# Patient Record
Sex: MALE | Race: AMERICAN INDIAN OR ALASKA NATIVE | NOT HISPANIC OR LATINO | Employment: FULL TIME | URBAN - METROPOLITAN AREA
[De-identification: names, ages, dates, MRNs, and addresses within clinical notes are randomized per-mention and may not be internally consistent; named-entity substitution may affect disease eponyms.]

---

## 2017-03-29 ENCOUNTER — ALLSCRIPTS OFFICE VISIT (OUTPATIENT)
Dept: OTHER | Facility: OTHER | Age: 62
End: 2017-03-29

## 2018-04-13 ENCOUNTER — OFFICE VISIT (OUTPATIENT)
Dept: DERMATOLOGY | Facility: CLINIC | Age: 63
End: 2018-04-13
Payer: COMMERCIAL

## 2018-04-13 DIAGNOSIS — Z13.89 SCREENING FOR SKIN CONDITION: ICD-10-CM

## 2018-04-13 DIAGNOSIS — L40.9 PSORIASIS: ICD-10-CM

## 2018-04-13 DIAGNOSIS — L82.1 SEBORRHEIC KERATOSIS: ICD-10-CM

## 2018-04-13 DIAGNOSIS — B00.9 HERPES SIMPLEX INFECTION OF SKIN: Primary | ICD-10-CM

## 2018-04-13 PROCEDURE — 99213 OFFICE O/P EST LOW 20 MIN: CPT | Performed by: DERMATOLOGY

## 2018-04-13 RX ORDER — VALACYCLOVIR HYDROCHLORIDE 500 MG/1
1 TABLET, FILM COATED ORAL DAILY
COMMUNITY
Start: 2014-02-12 | End: 2018-04-13 | Stop reason: SDUPTHER

## 2018-04-13 RX ORDER — DESONIDE 0.5 MG/G
CREAM TOPICAL 2 TIMES DAILY
COMMUNITY
Start: 2014-02-12

## 2018-04-13 RX ORDER — LEVOCETIRIZINE DIHYDROCHLORIDE 5 MG/1
1 TABLET, FILM COATED ORAL
COMMUNITY
Start: 2014-02-12

## 2018-04-13 RX ORDER — IBUPROFEN 800 MG/1
TABLET ORAL
COMMUNITY

## 2018-04-13 RX ORDER — DIPHENOXYLATE HYDROCHLORIDE AND ATROPINE SULFATE 2.5; .025 MG/1; MG/1
TABLET ORAL
COMMUNITY

## 2018-04-13 RX ORDER — FLUTICASONE PROPIONATE 50 MCG
SPRAY, SUSPENSION (ML) NASAL
COMMUNITY
Start: 2018-03-28 | End: 2022-07-19

## 2018-04-13 RX ORDER — INSULIN GLARGINE 300 U/ML
INJECTION, SOLUTION SUBCUTANEOUS
COMMUNITY
Start: 2018-04-02

## 2018-04-13 RX ORDER — VALACYCLOVIR HYDROCHLORIDE 500 MG/1
500 TABLET, FILM COATED ORAL DAILY
Qty: 90 TABLET | Refills: 3 | Status: SHIPPED | OUTPATIENT
Start: 2018-04-13 | End: 2019-05-02 | Stop reason: SDUPTHER

## 2018-04-13 NOTE — PATIENT INSTRUCTIONS
herpes simplex infection continue suppressive therapy follow-up in 1 year   psoriasis does not appear to bother patient much can continue just moisturizes as needed  Seborrheic Keratosis  Patient reasurred these are normal growths we acquire with age no treatment needed    Screening for Dermatologic Disorders: Nothing else of concern noted on complete exam follow up in 1 year

## 2018-04-13 NOTE — PROGRESS NOTES
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  Infection patient has been on suppressive therapy with valacyclovir for years without problems noted  No past medical history on file  No past surgical history on file  Social History   History   Alcohol use Not on file     History   Drug use: Unknown     History   Smoking Status    Never Smoker   Smokeless Tobacco    Never Used     No family history on file  Meds/Allergies   Allergies   Allergen Reactions    Aspirin      Other reaction(s): Unknown    Ciprofloxacin      Other reaction(s): (PIMS)    Corticosteroids     Fenofibrate      Other reaction(s): (PIMS) severe myalgias    Metformin      Other reaction(s): (PIMS) GI distress    Penicillin G      Other reaction(s): (PIMS)    Penicillins     Statins        Meds:  Prior to Admission medications    Medication Sig Start Date End Date Taking?  Authorizing Provider   desonide (DESOWEN) 0 05 % cream Apply topically 2 (two) times a day 14  Yes Historical Provider, MD   fluticasone (FLONASE) 50 mcg/act nasal spray USE ONE SPRAY IN EACH NOSTRIL ONCE A DAY  3/28/18  Yes Historical Provider, MD   ibuprofen (MOTRIN) 800 mg tablet Take by mouth   Yes Historical Provider, MD   levocetirizine (XYZAL) 5 MG tablet Take 1 tablet by mouth 14  Yes Historical Provider, MD   multivitamin (THERAGRAN) TABS Take by mouth   Yes Historical Provider, MD   OXYMETAZOLINE HCL, OPHTH, 0 025 % SOLN 2 sprays into each nostril 18 Yes Historical Provider, MD Laura Ch injection pen 300 units/mL  4/2/18  Yes Historical Provider, MD   valACYclovir (VALTREX) 500 mg tablet Take 1 tablet by mouth daily 2/12/14  Yes Historical Provider, MD       Subjective:     Review of Systems:    General: negative for - chills, fatigue, fever,  weight gain or weight loss  Psychological: negative for - anxiety, behavioral disorder, concentration difficulties, decreased libido, depression, irritability, memory difficulties, mood swings, sleep disturbances or suicidal ideation  ENT: negative for - hearing difficulties , nasal congestion, nasal discharge, oral lesions, sinus pain, sneezing, sore throat  Allergy and Immunology: negative for - hives, insect bite sensitivity,  Hematological and Lymphatic: negative for - bleeding problems, blood clots,bruising, swollen lymph nodes  Endocrine: negative for - hair pattern changes, hot flashes, malaise/lethargy, mood swings, palpitations, polydipsia/polyuria, skin changes, temperature intolerance or unexpected weight change  Respiratory: negative for - cough, hemoptysis, orthopnea, shortness of breath, or wheezing  Cardiovascular: negative for - chest pain, dyspnea on exertion, edema,  Gastrointestinal: negative for - abdominal pain, nausea/vomiting  Genito-Urinary: negative for - dysuria, incontinence, irregular/heavy menses or urinary frequency/urgency  Musculoskeletal: negative for - gait disturbance, joint pain, joint stiffness, joint swelling, muscle pain, muscular weakness  Dermatological:  As in HPI  Neurological: negative for confusion, dizziness, headaches, impaired coordination/balance, memory loss, numbness/tingling, seizures, speech problems, tremors or weakness       Objective: There were no vitals taken for this visit      Physical Exam:    General Appearance:    Alert, cooperative, no distress   Head:    Normocephalic, without obvious abnormality, atraumatic           Skin:   A full skin exam was performed including scalp, head scalp, eyes, ears, nose, lips, neck, chest, axilla, abdomen, back, buttocks, bilateral upper extremities, bilateral lower extremities, hands, feet, fingers, toes, fingernails, and toenails  Scaling erythematous areas on the elbows little bit on the feet no other evidence of psoriasis elsewhere nothing else of concern noted exam normal keratotic papules with greasy stuck on appearance     Assessment:     1  Herpes simplex infection of skin  valACYclovir (VALTREX) 500 mg tablet   2  Screening for skin condition     3  Psoriasis     4  Seborrheic keratosis           Plan:    herpes simplex infection continue suppressive therapy follow-up in 1 year   psoriasis does not appear to bother patient much can continue just moisturizes as needed  Seborrheic Keratosis  Patient reasurred these are normal growths we acquire with age no treatment needed  Screening for Dermatologic Disorders: Nothing else of concern noted on complete exam follow up in 1 year       Dong Adair MD  6/84/8927,0:86 AM    Portions of the record may have been created with voice recognition software   Occasional wrong word or "sound a like" substitutions may have occurred due to the inherent limitations of voice recognition software   Read the chart carefully and recognize, using context, where substitutions have occurred

## 2019-05-02 ENCOUNTER — OFFICE VISIT (OUTPATIENT)
Dept: DERMATOLOGY | Facility: CLINIC | Age: 64
End: 2019-05-02
Payer: COMMERCIAL

## 2019-05-02 DIAGNOSIS — Z13.89 SCREENING FOR SKIN CONDITION: ICD-10-CM

## 2019-05-02 DIAGNOSIS — L40.9 PSORIASIS: ICD-10-CM

## 2019-05-02 DIAGNOSIS — L82.1 SEBORRHEIC KERATOSIS: ICD-10-CM

## 2019-05-02 DIAGNOSIS — B00.9 HERPES SIMPLEX INFECTION OF SKIN: Primary | ICD-10-CM

## 2019-05-02 PROCEDURE — 99213 OFFICE O/P EST LOW 20 MIN: CPT | Performed by: DERMATOLOGY

## 2019-05-02 RX ORDER — LOSARTAN POTASSIUM 25 MG/1
12.5 TABLET ORAL DAILY
COMMUNITY
Start: 2018-11-12 | End: 2020-08-13

## 2019-05-02 RX ORDER — VALACYCLOVIR HYDROCHLORIDE 500 MG/1
500 TABLET, FILM COATED ORAL DAILY
Qty: 90 TABLET | Refills: 3 | Status: SHIPPED | OUTPATIENT
Start: 2019-05-02 | End: 2019-12-04 | Stop reason: SDUPTHER

## 2019-12-04 DIAGNOSIS — B00.9 HERPES SIMPLEX INFECTION OF SKIN: ICD-10-CM

## 2019-12-04 RX ORDER — VALACYCLOVIR HYDROCHLORIDE 500 MG/1
500 TABLET, FILM COATED ORAL DAILY
Qty: 90 TABLET | Refills: 3 | Status: SHIPPED | OUTPATIENT
Start: 2019-12-04 | End: 2020-06-05 | Stop reason: SDUPTHER

## 2020-06-05 DIAGNOSIS — B00.9 HERPES SIMPLEX INFECTION OF SKIN: ICD-10-CM

## 2020-06-05 RX ORDER — VALACYCLOVIR HYDROCHLORIDE 500 MG/1
500 TABLET, FILM COATED ORAL DAILY
Qty: 90 TABLET | Refills: 3 | Status: SHIPPED | OUTPATIENT
Start: 2020-06-05 | End: 2020-08-13 | Stop reason: SDUPTHER

## 2020-07-06 ENCOUNTER — TELEPHONE (OUTPATIENT)
Dept: DERMATOLOGY | Facility: CLINIC | Age: 65
End: 2020-07-06

## 2020-08-13 ENCOUNTER — OFFICE VISIT (OUTPATIENT)
Dept: DERMATOLOGY | Facility: CLINIC | Age: 65
End: 2020-08-13
Payer: MEDICARE

## 2020-08-13 VITALS — TEMPERATURE: 97 F

## 2020-08-13 DIAGNOSIS — B00.9 HERPES SIMPLEX INFECTION OF SKIN: Primary | ICD-10-CM

## 2020-08-13 DIAGNOSIS — Z13.89 SCREENING FOR SKIN CONDITION: ICD-10-CM

## 2020-08-13 DIAGNOSIS — L40.9 PSORIASIS: ICD-10-CM

## 2020-08-13 DIAGNOSIS — L82.1 SEBORRHEIC KERATOSIS: ICD-10-CM

## 2020-08-13 PROCEDURE — 99213 OFFICE O/P EST LOW 20 MIN: CPT | Performed by: DERMATOLOGY

## 2020-08-13 RX ORDER — VALACYCLOVIR HYDROCHLORIDE 500 MG/1
500 TABLET, FILM COATED ORAL DAILY
Qty: 90 TABLET | Refills: 3 | Status: SHIPPED | OUTPATIENT
Start: 2020-08-13 | End: 2021-10-08 | Stop reason: SDUPTHER

## 2020-08-13 NOTE — PROGRESS NOTES
[address] [facility] DERMATOLOGY  [address] [place] [id number]  [phone]  [phone]     MRN: [id number] : 1955  Encounter: [id number]  Patient Information: Tena Veras  Chief complaint:Yearly skin check with history of herpes simplex    History of present illness:  79-year-old male with history of diabetes presents for overall skin check  Patient with history of erythema multiforme triggered by herpes simplex infection which has been controlled and suppressed with the use of valacyclovir for many years no other concerns noted at this time  Patient with known history of psoriasis as well  No past medical history on file  No past surgical history on file  Social History   Social History     Substance and Sexual Activity   Alcohol Use None     Social History     Substance and Sexual Activity   Drug Use Not on file     Social History     Tobacco Use   Smoking Status Never Smoker   Smokeless Tobacco Never Used     No family history on file  Meds/Allergies   Allergies   Allergen Reactions    Aspirin      Other reaction(s): Unknown    Ciprofloxacin      Other reaction(s): (PIMS)    Corticosteroids     Fenofibrate      Other reaction(s): (PIMS) severe myalgias    Metformin      Other reaction(s): (PIMS) GI distress    Penicillin G      Other reaction(s): (PIMS)    Penicillins     Statins        Meds:  Prior to Admission medications    Medication Sig Start Date End Date Taking?  Authorizing Provider   desonide (DESOWEN) 0 05 % cream Apply topically 2 (two) times a day 14  Yes Historical Provider, MD   fluticasone (FLONASE) 50 mcg/act nasal spray USE ONE SPRAY IN EACH NOSTRIL ONCE A DAY  3/28/18  Yes Historical Provider, MD   ibuprofen (MOTRIN) 800 mg tablet Take by mouth   Yes Historical Provider, MD   insulin glargine (Basaglar KwikPen) 100 units/mL injection pen Inject under the skin daily   Yes Historical Provider, MD   insulin lispro (HUMALOG) 100 units/mL injection Inject under the skin 3 (three) times a day before meals   Yes Historical Provider, MD   levocetirizine (XYZAL) 5 MG tablet Take 1 tablet by mouth 2/12/14  Yes Historical Provider, MD   losartan (COZAAR) 25 mg tablet Take 12 5 mg by mouth daily 11/12/18 8/13/20 Yes Historical Provider, MD   multivitamin (THERAGRAN) TABS Take by mouth   Yes Historical Provider, MD   OXYMETAZOLINE HCL, OPHTH, 0 025 % SOLN 2 sprays into each nostril 4/2/18  Yes Historical Provider, MD   valACYclovir (VALTREX) 500 mg tablet Take 1 tablet (500 mg total) by mouth daily 6/5/20 9/3/20 Yes Corrie Neff MD   Cloteal Buys SOLOSTAR injection pen 300 units/mL  4/2/18   Historical Provider, MD       Subjective:     Review of Systems:    General: negative for - chills, fatigue, fever,  weight gain or weight loss  Psychological: negative for - anxiety, behavioral disorder, concentration difficulties, decreased libido, depression, irritability, memory difficulties, mood swings, sleep disturbances or suicidal ideation  ENT: negative for - hearing difficulties , nasal congestion, nasal discharge, oral lesions, sinus pain, sneezing, sore throat  Allergy and Immunology: negative for - hives, insect bite sensitivity,  Hematological and Lymphatic: negative for - bleeding problems, blood clots,bruising, swollen lymph nodes  Endocrine: negative for - hair pattern changes, hot flashes, malaise/lethargy, mood swings, palpitations, polydipsia/polyuria, skin changes, temperature intolerance or unexpected weight change  Respiratory: negative for - cough, hemoptysis, orthopnea, shortness of breath, or wheezing  Cardiovascular: negative for - chest pain, dyspnea on exertion, edema,  Gastrointestinal: negative for - abdominal pain, nausea/vomiting  Genito-Urinary: negative for - dysuria, incontinence, irregular/heavy menses or urinary frequency/urgency  Musculoskeletal: negative for - gait disturbance, joint pain, joint stiffness, joint swelling, muscle pain, muscular weakness  Dermatological:  As in HPI  Neurological: negative for confusion, dizziness, headaches, impaired coordination/balance, memory loss, numbness/tingling, seizures, speech problems, tremors or weakness       Objective:   Temp (!) 97 °F (36 1 °C)     Physical Exam:    General Appearance:    Alert, cooperative, no distress   Head:    Normocephalic, without obvious abnormality, atraumatic           Skin:   A full skin exam was performed including scalp, head scalp, eyes, ears, nose, lips, neck, chest, axilla, abdomen, back, buttocks, bilateral upper extremities, bilateral lower extremities, hands, feet, fingers, toes, fingernails, and toenails slight scaling noted on the scalp little bit and nasal creases and elbows normal keratotic papules greasy stuck on appearance nothing else remarkable noted on complete exam     Assessment:     1  Herpes simplex infection of skin  valACYclovir (VALTREX) 500 mg tablet   2  Psoriasis     3  Seborrheic keratosis     4  Screening for skin condition           Plan:   Herpes simplex infection continue to treat with suppressive therapy due to his history of erythema multiforme  Psoriasis slightly active continue topical therapy suggest anti dandruff shampoo  Seborrheic Keratosis  Patient reasurred these are normal growths we acquire with age no treatment needed  Screening for Dermatologic Disorders: Nothing else of concern noted on complete exam follow up in 1 year       Josephine Cordova MD  8/13/2020,3:35 PM    Portions of the record may have been created with voice recognition software   Occasional wrong word or "sound a like" substitutions may have occurred due to the inherent limitations of voice recognition software   Read the chart carefully and recognize, using context, where substitutions have occurred

## 2020-08-13 NOTE — PATIENT INSTRUCTIONS
Herpes simplex infection continue to treat with suppressive therapy due to his history of erythema multiforme  Psoriasis slightly active continue topical therapy suggest anti dandruff shampoo  Seborrheic Keratosis  Patient reasurred these are normal growths we acquire with age no treatment needed    Screening for Dermatologic Disorders: Nothing else of concern noted on complete exam follow up in 1 year

## 2021-03-25 ENCOUNTER — OFFICE VISIT (OUTPATIENT)
Dept: DERMATOLOGY | Facility: CLINIC | Age: 66
End: 2021-03-25
Payer: MEDICARE

## 2021-03-25 VITALS — TEMPERATURE: 97.5 F

## 2021-03-25 DIAGNOSIS — L57.0 ACTINIC KERATOSIS: Primary | ICD-10-CM

## 2021-03-25 DIAGNOSIS — Z13.89 SCREENING FOR SKIN CONDITION: ICD-10-CM

## 2021-03-25 PROCEDURE — 99212 OFFICE O/P EST SF 10 MIN: CPT | Performed by: DERMATOLOGY

## 2021-03-25 PROCEDURE — 17000 DESTRUCT PREMALG LESION: CPT | Performed by: DERMATOLOGY

## 2021-03-25 RX ORDER — PEN NEEDLE, DIABETIC 32GX 5/32"
NEEDLE, DISPOSABLE MISCELLANEOUS
COMMUNITY
Start: 2021-01-25

## 2021-03-25 RX ORDER — ALBUTEROL SULFATE 90 UG/1
AEROSOL, METERED RESPIRATORY (INHALATION)
COMMUNITY
Start: 2021-01-27

## 2021-03-25 RX ORDER — HYDROCODONE BITARTRATE AND ACETAMINOPHEN 7.5; 325 MG/1; MG/1
TABLET ORAL
COMMUNITY

## 2021-03-25 RX ORDER — LEVOCETIRIZINE DIHYDROCHLORIDE 5 MG/1
TABLET, FILM COATED ORAL DAILY
COMMUNITY

## 2021-03-25 RX ORDER — ACETAMINOPHEN 325 MG/1
TABLET ORAL
COMMUNITY

## 2021-03-25 RX ORDER — CYCLOBENZAPRINE HCL 10 MG
10 TABLET ORAL 3 TIMES DAILY PRN
COMMUNITY
Start: 2021-03-23 | End: 2021-04-02

## 2021-03-25 RX ORDER — ONDANSETRON 4 MG/1
TABLET, ORALLY DISINTEGRATING ORAL EVERY 12 HOURS
COMMUNITY

## 2021-03-25 NOTE — PROGRESS NOTES
[address] [facility] DERMATOLOGY  [address] [place] [address]  [facility] [address]  [phone]  [phone]     MRN: [id number] : 1955  Encounter: [id number]  Patient Information: Lenny Jo  Chief complaint:  lesion on nose    History of present illness:  77year old male presents for lesion on the nose patient with known history of psoriasis and history of erythema multiforme due to a herpes simplex infection  Patient due for overall checkup in August   No past medical history on file  No past surgical history on file  Social History   Social History     Substance and Sexual Activity   Alcohol Use None     Social History     Substance and Sexual Activity   Drug Use Not on file     Social History     Tobacco Use   Smoking Status Never Smoker   Smokeless Tobacco Never Used     No family history on file  Meds/Allergies   Allergies   Allergen Reactions    Aspirin      Other reaction(s): Unknown    Ciprofloxacin      Other reaction(s): (PIMS)    Corticosteroids     Fenofibrate      Other reaction(s): (PIMS) severe myalgias    Metformin      Other reaction(s): (PIMS) GI distress    Penicillin G      Other reaction(s): (PIMS)    Penicillins     Statins        Meds:  Prior to Admission medications    Medication Sig Start Date End Date Taking?  Authorizing Provider   acetaminophen (Tylenol) 325 mg tablet Tylenol 325 mg tablet   Yes Historical Provider, MD   albuterol (PROVENTIL HFA,VENTOLIN HFA) 90 mcg/act inhaler  21  Yes Historical Provider, MD   BD Pen Needle Keke U/F 32G X 4 MM MISC  21  Yes Historical Provider, MD   canagliflozin (Invokana) 100 mg Invokana 100 mg tablet   Yes Historical Provider, MD   cyclobenzaprine (FLEXERIL) 10 mg tablet Take 10 mg by mouth Three times daily as needed 3/23/21 4/2/21 Yes Historical Provider, MD   desonide (DESOWEN) 0 05 % cream Apply topically 2 (two) times a day 14  Yes Historical Provider, MD   Diclofenac Epolamine (Flector) 1 3 % PTCH Every 12 hours   Yes Historical Provider, MD   fluticasone (FLONASE) 50 mcg/act nasal spray USE ONE SPRAY IN EACH NOSTRIL ONCE A DAY  3/28/18  Yes Historical Provider, MD   HYDROcodone-acetaminophen (NORCO) 7 5-325 mg per tablet hydrocodone 7 5 mg-acetaminophen 325 mg tablet   Yes Historical Provider, MD   ibuprofen (MOTRIN) 800 mg tablet Take by mouth   Yes Historical Provider, MD   insulin glargine (Basaglar KwikPen) 100 units/mL injection pen Inject under the skin daily   Yes Historical Provider, MD   insulin lispro (HUMALOG) 100 units/mL injection Inject under the skin 3 (three) times a day before meals   Yes Historical Provider, MD   levocetirizine (XYZAL) 5 MG tablet Take 1 tablet by mouth 2/12/14  Yes Historical Provider, MD   multivitamin (THERAGRAN) TABS Take by mouth   Yes Historical Provider, MD   OXYMETAZOLINE HCL, OPHTH, 0 025 % SOLN 2 sprays into each nostril 4/2/18  Yes Historical Provider, MD Allan Baptiste SOLOSTAR injection pen 300 units/mL  4/2/18  Yes Historical Provider, MD   levocetirizine (Xyzal) 5 MG tablet daily    Historical Provider, MD   losartan (COZAAR) 25 mg tablet Take 12 5 mg by mouth daily 11/12/18 8/13/20  Historical Provider, MD   valACYclovir (VALTREX) 500 mg tablet Take 1 tablet (500 mg total) by mouth daily 8/13/20 11/11/20  Lidia Woods MD       Subjective:     Review of Systems:    General: negative for - chills, fatigue, fever,  weight gain or weight loss  Psychological: negative for - anxiety, behavioral disorder, concentration difficulties, decreased libido, depression, irritability, memory difficulties, mood swings, sleep disturbances or suicidal ideation  ENT: negative for - hearing difficulties , nasal congestion, nasal discharge, oral lesions, sinus pain, sneezing, sore throat  Allergy and Immunology: negative for - hives, insect bite sensitivity,  Hematological and Lymphatic: negative for - bleeding problems, blood clots,bruising, swollen lymph nodes  Endocrine: negative for - hair pattern changes, hot flashes, malaise/lethargy, mood swings, palpitations, polydipsia/polyuria, skin changes, temperature intolerance or unexpected weight change  Respiratory: negative for - cough, hemoptysis, orthopnea, shortness of breath, or wheezing  Cardiovascular: negative for - chest pain, dyspnea on exertion, edema,  Gastrointestinal: negative for - abdominal pain, nausea/vomiting  Genito-Urinary: negative for - dysuria, incontinence, irregular/heavy menses or urinary frequency/urgency  Musculoskeletal: negative for - gait disturbance, joint pain, joint stiffness, joint swelling, muscle pain, muscular weakness  Dermatological:  As in HPI  Neurological: negative for confusion, dizziness, headaches, impaired coordination/balance, memory loss, numbness/tingling, seizures, speech problems, tremors or weakness       Objective:   Temp 97 5 °F (36 4 °C) (Temporal)     Physical Exam:    General Appearance:    Alert, cooperative, no distress   Head:    Normocephalic, without obvious abnormality, atraumatic           Skin:   A full skin exam was performed including scalp, head scalp, eyes, ears, nose, lips, neck, chest, axilla, abdomen, back, buttocks, bilateral upper extremities, bilateral lower extremities, hands, feet, fingers, toes, fingernails, and toenails scaling erythematous area noted on the nose nothing else noted on exam  Physical Exam  HENT:      Head:          Cryotherapy Procedure Note    Pre-operative Diagnosis: actinic keratosis    Plan:  1  Instructed to keep the area dry and clean thereafter  Apply petrolatum if area gets crusty  2  Recommended that the patient use acetaminophen  as needed for pain       Locations:   nasal tip    Indications: Destruction of actinic keratosis x1    Patient informed of risks (permanent scarring, infection, light or dark discoloration, bleeding, infection, weakness, numbness and recurrence of the lesion) and benefits of the procedure and verbal informed consent obtained  The areas are treated with liquid nitrogen therapy, frozen until ice ball extended 2 mm beyond lesion, allowed to thaw, and treated again  The patient tolerated procedure well  The patient was instructed on post-op care, warned that there may be blister formation, redness and pain  Recommend OTC analgesia as needed for pain  Condition:  Stable    Complications:  none  Assessment:     1  Actinic keratosis           Plan:   Actinic Keratosis:  Patient advised lesions are precancers  These should resolve with cryosurgery if not to let us know sun block recommended  Patient to return follow-up in August for overall checkup    Elle Hays MD  3/25/2021,9:35 AM    Portions of the record may have been created with voice recognition software   Occasional wrong word or "sound a like" substitutions may have occurred due to the inherent limitations of voice recognition software   Read the chart carefully and recognize, using context, where substitutions have occurred

## 2021-08-24 ENCOUNTER — OFFICE VISIT (OUTPATIENT)
Dept: DERMATOLOGY | Facility: CLINIC | Age: 66
End: 2021-08-24
Payer: MEDICARE

## 2021-08-24 DIAGNOSIS — L40.9 PSORIASIS: Primary | ICD-10-CM

## 2021-08-24 DIAGNOSIS — Z13.89 SCREENING FOR SKIN CONDITION: ICD-10-CM

## 2021-08-24 DIAGNOSIS — L82.1 SEBORRHEIC KERATOSIS: ICD-10-CM

## 2021-08-24 PROCEDURE — 99213 OFFICE O/P EST LOW 20 MIN: CPT | Performed by: DERMATOLOGY

## 2021-08-24 NOTE — PROGRESS NOTES
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 presents for overall checkup no specific concerns noted today also with history of psoriasis which he is treated only with moisturizers  History reviewed  No pertinent past medical history  History reviewed  No pertinent surgical history  Social History   Social History     Substance and Sexual Activity   Alcohol Use None     Social History     Substance and Sexual Activity   Drug Use Not on file     Social History     Tobacco Use   Smoking Status Never Smoker   Smokeless Tobacco Never Used     History reviewed  No pertinent family history  Meds/Allergies   Allergies   Allergen Reactions    Aspirin      Other reaction(s): Unknown    Ciprofloxacin      Other reaction(s): (PIMS)    Corticosteroids     Fenofibrate      Other reaction(s): (PIMS) severe myalgias    Metformin      Other reaction(s): (PIMS) GI distress    Penicillin G      Other reaction(s): (PIMS)    Penicillins     Statins        Meds:  Prior to Admission medications    Medication Sig Start Date End Date Taking?  Authorizing Provider   acetaminophen (Tylenol) 325 mg tablet Tylenol 325 mg tablet   Yes Historical Provider, MD   albuterol (PROVENTIL HFA,VENTOLIN HFA) 90 mcg/act inhaler  21  Yes Historical Provider, MD   BD Pen Needle Keke U/F 32G X 4 MM MISC  21  Yes Historical Provider, MD   canagliflozin (Invokana) 100 mg Invokana 100 mg tablet   Yes Historical Provider, MD   desonide (DESOWEN) 0 05 % cream Apply topically 2 (two) times a day 14  Yes Historical Provider, MD   Diclofenac Epolamine (Flector) 1 3 % PTCH Every 12 hours   Yes Historical Provider, MD   fluticasone (FLONASE) 50 mcg/act nasal spray USE ONE SPRAY IN EACH NOSTRIL ONCE A DAY  3/28/18  Yes Historical Provider, MD   HYDROcodone-acetaminophen ([address]) 7 5-325 mg per tablet hydrocodone 7 5 mg-acetaminophen 325 mg tablet   Yes Historical Provider, MD   ibuprofen (MOTRIN) 800 mg tablet Take by mouth   Yes Historical Provider, MD   insulin glargine (Basaglar KwikPen) 100 units/mL injection pen Inject under the skin daily   Yes Historical Provider, MD   insulin lispro (HUMALOG) 100 units/mL injection Inject under the skin 3 (three) times a day before meals   Yes Historical Provider, MD   levocetirizine (XYZAL) 5 MG tablet Take 1 tablet by mouth 2/12/14  Yes Historical Provider, MD   multivitamin (THERAGRAN) TABS Take by mouth   Yes Historical Provider, MD   OXYMETAZOLINE HCL, OPHTH, 0 025 % SOLN 2 sprays into each nostril 4/2/18  Yes Historical Provider, MD   cyclobenzaprine (FLEXERIL) 10 mg tablet Take 10 mg by mouth Three times daily as needed 3/23/21 4/2/21  Historical Provider, MD   levocetirizine (Xyzal) 5 MG tablet daily  Patient not taking: Reported on 8/24/2021    Historical Provider, MD   losartan (COZAAR) 25 mg tablet Take 12 5 mg by mouth daily 11/12/18 8/13/20  Historical Provider, MD Lillian Cohen SOLOSTAR injection pen 300 units/mL  4/2/18   Historical Provider, MD   valACYclovir (VALTREX) 500 mg tablet Take 1 tablet (500 mg total) by mouth daily 8/13/20 11/11/20  Annalee Walsh MD       Subjective:     Review of Systems:    General: negative for - chills, fatigue, fever,  weight gain or weight loss  Psychological: negative for - anxiety, behavioral disorder, concentration difficulties, decreased libido, depression, irritability, memory difficulties, mood swings, sleep disturbances or suicidal ideation  ENT: negative for - hearing difficulties , nasal congestion, nasal discharge, oral lesions, sinus pain, sneezing, sore throat  Allergy and Immunology: negative for - hives, insect bite sensitivity,  Hematological and Lymphatic: negative for - bleeding problems, blood clots,bruising, swollen lymph nodes  Endocrine: negative for - hair pattern changes, hot flashes, malaise/lethargy, mood swings, palpitations, polydipsia/polyuria, skin changes, temperature intolerance or unexpected weight change  Respiratory: negative for - cough, hemoptysis, orthopnea, shortness of breath, or wheezing  Cardiovascular: negative for - chest pain, dyspnea on exertion, edema,  Gastrointestinal: negative for - abdominal pain, nausea/vomiting  Genito-Urinary: negative for - dysuria, incontinence, irregular/heavy menses or urinary frequency/urgency  Musculoskeletal: negative for - gait disturbance, joint pain, joint stiffness, joint swelling, muscle pain, muscular weakness  Dermatological:  As in HPI  Neurological: negative for confusion, dizziness, headaches, impaired coordination/balance, memory loss, numbness/tingling, seizures, speech problems, tremors or weakness       Objective: There were no vitals taken for this visit  Physical Exam:    General Appearance:    Alert, cooperative, no distress   Head:    Normocephalic, without obvious abnormality, atraumatic           Skin:   A full skin exam was performed including scalp, head scalp, eyes, ears, nose, lips, neck, chest, axilla, abdomen, back, buttocks, bilateral upper extremities, bilateral lower extremities, hands, feet, fingers, toes, fingernails, and toenails well-demarcated erythematous scaling plaques with silvery scale noted on the elbows a little bit on the shin no other evidence psoriasis at this point no signs of any actinic keratosis nothing else remarkable noted on complete exam     Assessment:     1  Psoriasis     2  Seborrheic keratosis     3   Screening for skin condition           Plan:    psoriasis is not really symptomatic at this point good control continue same therapy  Seborrheic Keratosis  Patient reasurred these are normal growths we acquire with age no treatment needed  Screening for Dermatologic Disorders: Nothing else of concern noted on complete exam follow up in 1 year         Richi Martinez MD  1/95/8327,2:95 PM    Portions of the record may have been created with voice recognition software   Occasional wrong word or "sound a like" substitutions may have occurred due to the inherent limitations of voice recognition software   Read the chart carefully and recognize, using context, where substitutions have occurred

## 2021-08-24 NOTE — PATIENT INSTRUCTIONS
psoriasis is not really symptomatic at this point good control continue same therapy  Seborrheic Keratosis  Patient reasurred these are normal growths we acquire with age no treatment needed    Screening for Dermatologic Disorders: Nothing else of concern noted on complete exam follow up in 1 year

## 2021-10-08 DIAGNOSIS — B00.9 HERPES SIMPLEX INFECTION OF SKIN: ICD-10-CM

## 2021-10-08 RX ORDER — VALACYCLOVIR HYDROCHLORIDE 500 MG/1
500 TABLET, FILM COATED ORAL DAILY
Qty: 90 TABLET | Refills: 3 | Status: SHIPPED | OUTPATIENT
Start: 2021-10-08 | End: 2022-01-06

## 2022-01-11 ENCOUNTER — OFFICE VISIT (OUTPATIENT)
Dept: DERMATOLOGY | Facility: CLINIC | Age: 67
End: 2022-01-11
Payer: MEDICARE

## 2022-01-11 DIAGNOSIS — T14.8XXA EXCORIATION: Primary | ICD-10-CM

## 2022-01-11 PROCEDURE — 99212 OFFICE O/P EST SF 10 MIN: CPT | Performed by: DERMATOLOGY

## 2022-01-11 NOTE — PATIENT INSTRUCTIONS
Unable to really assess lesion on the nose if it does not completely healed ahead the next visit we will plan on biopsy other than that patient advised not to pick at these areas

## 2022-01-11 NOTE — PROGRESS NOTES
500 [facility] DERMATOLOGY  [address] [place] [address]  [phone]  [phone]     MRN: [id number] : 1955  Encounter: [id number]  Patient Information: Jacquelin Buaer    Subjective:     77year old male had a small scaling area on his nose that he picked off and was concerned that it was not healing correctly but now that it has been about 2 weeks since this occurred appears to be resolving     Objective: There were no vitals taken for this visit  Physical Exam:    General Appearance:    Alert, cooperative, no distress   Skin:   Small healing erosion on the nose     Assessment:     1  Excoriation           Plan:   Unable to really assess lesion on the nose if it does not completely healed ahead the next visit we will plan on biopsy other than that patient advised not to pick at these areas      Prior to Admission medications    Medication Sig Start Date End Date Taking?  Authorizing Provider   acetaminophen (Tylenol) 325 mg tablet Tylenol 325 mg tablet   Yes Historical Provider, MD   albuterol (PROVENTIL HFA,VENTOLIN HFA) 90 mcg/act inhaler  21  Yes Historical Provider, MD   BD Pen Needle Keke U/F 32G X 4 MM MISC  21  Yes Historical Provider, MD   canagliflozin (Invokana) 100 mg Invokana 100 mg tablet   Yes Historical Provider, MD   desonide (DESOWEN) 0 05 % cream Apply topically 2 (two) times a day 14  Yes Historical Provider, MD   fluticasone (FLONASE) 50 mcg/act nasal spray USE ONE SPRAY IN EACH NOSTRIL ONCE A DAY  3/28/18  Yes Historical Provider, MD   HYDROcodone-acetaminophen (NORCO) 7 5-325 mg per tablet hydrocodone 7 5 mg-acetaminophen 325 mg tablet   Yes Historical Provider, MD   ibuprofen (MOTRIN) 800 mg tablet Take by mouth   Yes Historical Provider, MD   insulin glargine (Basaglar KwikPen) 100 units/mL injection pen Inject under the skin daily   Yes Historical Provider, MD   insulin lispro (HUMALOG) 100 units/mL injection Inject under the skin 3 (three) times a day before meals   Yes Historical Provider, MD   levocetirizine (XYZAL) 5 MG tablet Take 1 tablet by mouth 2/12/14  Yes Historical Provider, MD   levocetirizine (Xyzal) 5 MG tablet daily     Yes Historical Provider, MD   multivitamin (THERAGRAN) TABS Take by mouth   Yes Historical Provider, MD   OXYMETAZOLINE HCL, OPHTH, 0 025 % SOLN 2 sprays into each nostril 4/2/18  Yes Historical Provider, MD Michael Smart SOLOSTAR injection pen 300 units/mL   4/2/18  Yes Historical Provider, MD   cyclobenzaprine (FLEXERIL) 10 mg tablet Take 10 mg by mouth Three times daily as needed 3/23/21 4/2/21  Historical Provider, MD   Diclofenac Epolamine (Flector) 1 3 % PTCH Every 12 hours    Historical Provider, MD   losartan (COZAAR) 25 mg tablet Take 12 5 mg by mouth daily 11/12/18 8/13/20  Historical Provider, MD   valACYclovir (VALTREX) 500 mg tablet Take 1 tablet (500 mg total) by mouth daily 10/8/21 1/6/22  Savanna Keating MD     Allergies   Allergen Reactions    Aspirin      Other reaction(s): Unknown    Ciprofloxacin      Other reaction(s): (PIMS)    Corticosteroids     Fenofibrate      Other reaction(s): (PIMS) severe myalgias    Metformin      Other reaction(s): (PIMS) GI distress    Penicillin G      Other reaction(s): (PIMS)    Penicillins     Statins        Savanna Keating MD  1/11/2022,3:21 PM    Portions of the record may have been created with voice recognition software   Occasional wrong word or "sound a like" substitutions may have occurred due to the inherent limitations of voice recognition software   Read the chart carefully and recognize, using context, where substitutions have occurred

## 2022-10-03 ENCOUNTER — OFFICE VISIT (OUTPATIENT)
Dept: DERMATOLOGY | Facility: CLINIC | Age: 67
End: 2022-10-03
Payer: MEDICARE

## 2022-10-03 VITALS — WEIGHT: 225 LBS | BODY MASS INDEX: 29.82 KG/M2 | HEIGHT: 73 IN

## 2022-10-03 DIAGNOSIS — B00.9 HERPES SIMPLEX INFECTION OF SKIN: ICD-10-CM

## 2022-10-03 DIAGNOSIS — L82.1 SEBORRHEIC KERATOSIS: ICD-10-CM

## 2022-10-03 DIAGNOSIS — Z13.89 SCREENING FOR SKIN CONDITION: ICD-10-CM

## 2022-10-03 DIAGNOSIS — L40.9 PSORIASIS: Primary | ICD-10-CM

## 2022-10-03 PROCEDURE — 99214 OFFICE O/P EST MOD 30 MIN: CPT | Performed by: DERMATOLOGY

## 2022-10-03 RX ORDER — VALACYCLOVIR HYDROCHLORIDE 500 MG/1
500 TABLET, FILM COATED ORAL DAILY
Qty: 90 TABLET | Refills: 3 | Status: SHIPPED | OUTPATIENT
Start: 2022-10-03 | End: 2023-01-01

## 2022-10-03 NOTE — PROGRESS NOTES
[facility] DERMATOLOGY  [address] [place] [address]  [phone]  [phone]     MRN: [id number] : 1955  Encounter: [id number]  Patient Information: Eric Merrill  Chief complaint: yearly check up    History of present illness:  51-year-old male presents for overall checkup history of erythema multiforme related to herpes simplex infection also with known history of psoriasis no specific concerns or changes noted  No past medical history on file  No past surgical history on file  Social History   Social History     Substance and Sexual Activity   Alcohol Use None     Social History     Substance and Sexual Activity   Drug Use Not on file     Social History     Tobacco Use   Smoking Status Never Smoker   Smokeless Tobacco Never Used     No family history on file  Meds/Allergies   Allergies   Allergen Reactions    Aspirin      Other reaction(s): Unknown    Ciprofloxacin      Other reaction(s): (PIMS)    Corticosteroids     Fenofibrate      Other reaction(s): (PIMS) severe myalgias    Metformin      Other reaction(s): (PIMS) GI distress    Penicillin G      Other reaction(s): (PIMS)    Penicillins     Statins        Meds:  Prior to Admission medications    Medication Sig Start Date End Date Taking?  Authorizing Provider   acetaminophen (TYLENOL) 325 mg tablet Tylenol 325 mg tablet   Yes Historical Provider, MD   albuterol (PROVENTIL HFA,VENTOLIN HFA) 90 mcg/act inhaler  21  Yes Historical Provider, MD   BD Pen Needle Keke U/F 32G X 4 MM MISC  21  Yes Historical Provider, MD   canagliflozin (INVOKANA) 100 mg Invokana 100 mg tablet   Yes Historical Provider, MD   cyclobenzaprine (FLEXERIL) 10 mg tablet Take 10 mg by mouth Three times daily as needed 3/23/21 10/3/22 Yes Historical Provider, MD   desonide (DESOWEN) 0 05 % cream Apply topically 2 (two) times a day 14  Yes Historical Provider, MD   fluticasone (FLONASE) 50 mcg/act nasal spray USE ONE SPRAY IN EACH NOSTRIL TWICE DAILY 7/19/22  Yes Delvis Mason MD   HYDROcodone-acetaminophen (NORCO) 7 5-325 mg per tablet hydrocodone 7 5 mg-acetaminophen 325 mg tablet   Yes Historical Provider, MD   ibuprofen (MOTRIN) 800 mg tablet Take by mouth   Yes Historical Provider, MD   insulin glargine (LANTUS SOLOSTAR) 100 units/mL injection pen Inject under the skin daily   Yes Historical Provider, MD   insulin lispro (HumaLOG) 100 units/mL injection Inject under the skin 3 (three) times a day before meals   Yes Historical Provider, MD   levocetirizine (XYZAL) 5 MG tablet daily     Yes Historical Provider, MD   levocetirizine (XYZAL) 5 MG tablet TAKE ONE TABLET BY MOUTH EVERY DAY 7/12/22  Yes Delvis Mason MD   losartan (COZAAR) 25 mg tablet Take 12 5 mg by mouth daily 11/12/18 10/3/22 Yes Historical Provider, MD   multivitamin (THERAGRAN) TABS Take by mouth   Yes Historical Provider, MD   OXYMETAZOLINE HCL, OPHTH, 0 025 % SOLN 2 sprays into each nostril 4/2/18  Yes Historical Provider, MD   valACYclovir (VALTREX) 500 mg tablet Take 1 tablet (500 mg total) by mouth daily 10/8/21 10/3/22 Yes Aden Bowen MD   Diclofenac Epolamine 1 3 % PTCH Every 12 hours    Historical Provider, MD   levocetirizine (XYZAL) 5 MG tablet Take 1 tablet by mouth  Patient not taking: Reported on 10/3/2022 2/12/14   Historical Provider, MD Kari Mott injection pen 300 units/mL   4/2/18   Historical Provider, MD       Subjective:     Review of Systems:    General: negative for - chills, fatigue, fever,  weight gain or weight loss  Psychological: negative for - anxiety, behavioral disorder, concentration difficulties, decreased libido, depression, irritability, memory difficulties, mood swings, sleep disturbances or suicidal ideation  ENT: negative for - hearing difficulties , nasal congestion, nasal discharge, oral lesions, sinus pain, sneezing, sore throat  Allergy and Immunology: negative for - hives, insect bite sensitivity,  Hematological and Lymphatic: negative for - bleeding problems, blood clots,bruising, swollen lymph nodes  Endocrine: negative for - hair pattern changes, hot flashes, malaise/lethargy, mood swings, palpitations, polydipsia/polyuria, skin changes, temperature intolerance or unexpected weight change  Respiratory: negative for - cough, hemoptysis, orthopnea, shortness of breath, or wheezing  Cardiovascular: negative for - chest pain, dyspnea on exertion, edema,  Gastrointestinal: negative for - abdominal pain, nausea/vomiting  Genito-Urinary: negative for - dysuria, incontinence, irregular/heavy menses or urinary frequency/urgency  Musculoskeletal: negative for - gait disturbance, joint pain, joint stiffness, joint swelling, muscle pain, muscular weakness  Dermatological:  As in HPI  Neurological: negative for confusion, dizziness, headaches, impaired coordination/balance, memory loss, numbness/tingling, seizures, speech problems, tremors or weakness       Objective:   Ht 6' 1" (1 854 m)   Wt 102 kg (225 lb)   BMI 29 69 kg/m²     Physical Exam:    General Appearance:    Alert, cooperative, no distress   Head:    Normocephalic, without obvious abnormality, atraumatic           Skin:   A full skin exam was performed including scalp, head scalp, eyes, ears, nose, lips, neck, chest, axilla, abdomen, back, buttocks, bilateral upper extremities, bilateral lower extremities, hands, feet, fingers, toes, fingernails, and toenails slight erythema scaling noted on the elbows normal keratotic papules greasy stuck on appearance nothing else atypical noted on exam     Assessment:     1  Psoriasis     2  Herpes simplex infection of skin  valACYclovir (VALTREX) 500 mg tablet   3  Seborrheic keratosis     4   Screening for skin condition           Plan:   Psoriasis not real active no treatment indicated this time  History of recurrent herpes simplex infection and erythema multiforme patient will continue suppressive therapy with valacyclovir  Seborrheic Keratosis  Patient reasurred these are normal growths we acquire with age no treatment needed  Screening for Dermatologic Disorders: Nothing else of concern noted on complete exam follow up in 1 year     Juju Coburn  10/3/2022,10:09 AM    Portions of the record may have been created with voice recognition software   Occasional wrong word or "sound a like" substitutions may have occurred due to the inherent limitations of voice recognition software   Read the chart carefully and recognize, using context, where substitutions have occurred

## 2022-10-03 NOTE — PATIENT INSTRUCTIONS
Psoriasis not real active no treatment indicated this time  History of recurrent herpes simplex infection and erythema multiforme patient will continue suppressive therapy with valacyclovir  Seborrheic Keratosis  Patient reasurred these are normal growths we acquire with age no treatment needed    Screening for Dermatologic Disorders: Nothing else of concern noted on complete exam follow up in 1 year

## 2022-11-21 ENCOUNTER — TELEPHONE (OUTPATIENT)
Dept: DERMATOLOGY | Age: 67
End: 2022-11-21

## 2022-11-21 NOTE — TELEPHONE ENCOUNTER
Patient left message stating he could not find is after visit summary for visit to Dr Yari Ozuna  Returned pt called, pt stated call was done by accident   He was able to view is visit on [employer]

## 2023-07-03 ENCOUNTER — OFFICE VISIT (OUTPATIENT)
Age: 68
End: 2023-07-03
Payer: MEDICARE

## 2023-07-03 VITALS
WEIGHT: 231.4 LBS | HEART RATE: 80 BPM | BODY MASS INDEX: 30.67 KG/M2 | SYSTOLIC BLOOD PRESSURE: 137 MMHG | DIASTOLIC BLOOD PRESSURE: 62 MMHG | HEIGHT: 73 IN | TEMPERATURE: 97 F | OXYGEN SATURATION: 97 % | RESPIRATION RATE: 18 BRPM
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
## 2023-07-03 DIAGNOSIS — S40.861A TICK BITE OF RIGHT UPPER ARM, INITIAL ENCOUNTER: Primary | ICD-10-CM

## 2023-07-03 PROCEDURE — 99213 OFFICE O/P EST LOW 20 MIN: CPT | Performed by: PHYSICIAN ASSISTANT

## 2023-07-03 PROCEDURE — G0463 HOSPITAL OUTPT CLINIC VISIT: HCPCS | Performed by: PHYSICIAN ASSISTANT

## 2023-07-03 RX ORDER — DOXYCYCLINE HYCLATE 100 MG/1
CAPSULE ORAL
Qty: 2 CAPSULE | Refills: 0 | Status: SHIPPED | OUTPATIENT
Start: 2023-07-03 | End: 2023-07-03

## 2023-07-03 NOTE — PATIENT INSTRUCTIONS
Tick Bite   WHAT YOU NEED TO KNOW:   Ticks need to be removed quickly. Most tick bites are not dangerous, but ticks can pass disease or infection when they bite. Diseases include Lyme disease, babesiosis, tularemia, and [place] Spotted Fever. Signs and symptoms may develop weeks or even months after a bite. Some may happen right away, such as redness, pain, itching, and swelling near the bite area. Watch for a fever, rash, body aches, or breathing problems. These may be symptoms of a serious disease that needs to be treated quickly. DISCHARGE INSTRUCTIONS:   Return to the emergency department if:   You have trouble walking or moving your legs. You have joint pain, muscle pain, or muscle weakness within 1 month of a tick bite. You have a fever, chills, headache, or rash. Call your doctor if:   You cannot remove the tick. The tick's head is stuck in your skin. You have questions or concerns about your condition or care. Medicines:   Medicines  help decrease pain, redness, itching, and swelling. You may also need medicine to prevent or fight a bacterial infection. These medicines may be given as a cream, lotion, or pill. Take your medicine as directed. Contact your healthcare provider if you think your medicine is not helping or if you have side effects. Tell your provider if you are allergic to any medicine. Keep a list of the medicines, vitamins, and herbs you take. Include the amounts, and when and why you take them. Bring the list or the pill bottles to follow-up visits. Carry your medicine list with you in case of an emergency. How to remove a tick:  Remove the tick as soon as possible to help prevent disease or infection. You are less likely to get sick from a tick bite if you remove the tick within 24 hours. Do not use petroleum jelly, nail polish, rubbing alcohol, or heat. These do not work and may be dangerous. Do the following to remove a tick:  First, try a soapy cotton ball. Soak a cotton ball in liquid soap. Cover the tick with the cotton ball for 30 seconds. The tick may come off with the cotton ball when you pull it away. Use tweezers if the soapy cotton ball does not work. Grasp the tick as close to your skin as possible. Pull the tick straight up and out. Do not touch the tick with your bare hands. Do not twist or jerk the tick suddenly, because this may break off the tick's head or mouth parts. Do not leave any part of the tick in your skin. Do not crush or squeeze the tick since its body may be infected with germs. Flush the tick down the toilet. After the tick is removed, clean the area of the bite with rubbing alcohol. Then wash your hands with soap and water. Apply ice  on your bite for 15 to 20 minutes every hour or as directed. Use an ice pack, or put crushed ice in a plastic bag. Cover it with a towel before you apply it to your skin. Ice helps prevent tissue damage and decreases swelling and pain. Prevent a tick bite:  Ticks live in areas covered by brush and grass. They may even be found in your lawn if you live in certain areas. Outdoor pets can carry ticks inside the house. Ticks can grab onto you or your clothes when you walk by grass or brush. If you go into areas that contain many trees, tall grasses, and underbrush, do the following:  Wear light colored pants and a long-sleeved shirt. Tuck your pants into your socks or boots. Tuck in your shirt. Wear sleeves that fit close to the skin at your wrists and neck. This will help prevent ticks from crawling through gaps in your clothing and onto your skin. Wear a hat in areas with trees. Apply insect repellant on your skin. The insect repellant should contain DEET. Do not put insect repellant on skin that is cut, scratched, or irritated. Always use soap and water to wash the insect repellant off as soon as possible once you are indoors.  Do not apply insect repellant on your child's face or hands.    Spray insect repellant onto your clothes. Use permethrin spray. This spray kills ticks that crawl on your clothing. Be sure to spray the tops of your boots, bottom of pant legs, and sleeve cuffs. As soon as possible, wash and dry clothing in hot water and high heat. Check your clothing, hair, and skin for ticks. Shower within 2 hours of coming indoors. Carefully check the hairline, armpits, neck, and waist. Check your pets and children for ticks. Remove ticks from pets the same way as you remove them from people. Decrease the risk for ticks in your yard. Ticks like to live in shady, moist areas. U[place]ess Yue your lawn regularly to keep the grass short. Trim the grass around birdbaths and fences. Cut branches that are overgrown and take them out of the yard. Clear out leaf piles. [place] firewood in a dry, radha area. Follow up with your doctor as directed:  Write down your questions so you remember to ask them during your visits. © Copyright Lorena Sanford 2022 Information is for End User's use only and may not be sold, redistributed or otherwise used for commercial purposes. The above information is an  only. It is not intended as medical advice for individual conditions or treatments. Talk to your doctor, nurse or pharmacist before following any medical regimen to see if it is safe and effective for you.

## 2023-07-03 NOTE — PROGRESS NOTES
[facility]        NAME: Kusum Lyons is a 76 y.o. male  : 1955    MRN: [id number]  DATE: July 3, 2023  TIME: 9:34 AM    Assessment and Plan   Tick bite of right upper arm, initial encounter [S40.861A, W57. XXXA]  1. Tick bite of right upper arm, initial encounter  doxycycline hyclate (VIBRAMYCIN) 100 mg capsule            Patient Instructions       Follow up with PCP in 3-5 days. Proceed to  ER if symptoms worsen. Chief Complaint     Chief Complaint   Patient presents with   • Tick Bite     Patient stated that he took a black tick off of his right arm yesterday. History of Present Illness       79-year-old male found a small tick attached to his right upper extremity distal to the elbow on the volar side, yesterday. Patient believes been feeding for approximately 24 to 30 hours. Review of Systems   Review of Systems   Constitutional: Negative for activity change, appetite change, chills, fatigue and fever. Respiratory: Negative for cough. Cardiovascular: Negative for chest pain and palpitations. Gastrointestinal: Negative for abdominal pain and nausea. Musculoskeletal: Negative for arthralgias, joint swelling and myalgias. Neurological: Negative for headaches. Current Medications       Current Outpatient Medications:   •  acetaminophen (TYLENOL) 325 mg tablet, Tylenol 325 mg tablet, Disp: , Rfl:   •  albuterol (PROVENTIL HFA,VENTOLIN HFA) 90 mcg/act inhaler, , Disp: , Rfl:   •  BD Pen Needle Keke U/F 32G X 4 MM MISC, , Disp: , Rfl:   •  canagliflozin (INVOKANA) 100 mg, Invokana 100 mg tablet, Disp: , Rfl:   •  desonide (DESOWEN) 0.05 % cream, Apply topically 2 (two) times a day, Disp: , Rfl:   •  doxycycline hyclate (VIBRAMYCIN) 100 mg capsule, 2 caps by mouth at once for one day only. , Disp: 2 capsule, Rfl: 0  •  fluticasone (FLONASE) 50 mcg/act nasal spray, 1 spray into each nostril 2 (two) times a day, Disp: 16 g, Rfl: 3  •  HYDROcodone-acetaminophen (NORCO) 7.5-325 mg per tablet, hydrocodone 7.5 mg-acetaminophen 325 mg tablet, Disp: , Rfl:   •  ibuprofen (MOTRIN) 800 mg tablet, Take by mouth, Disp: , Rfl:   •  insulin glargine (LANTUS SOLOSTAR) 100 units/mL injection pen, Inject under the skin daily, Disp: , Rfl:   •  insulin lispro (HumaLOG) 100 units/mL injection, Inject under the skin 3 (three) times a day before meals, Disp: , Rfl:   •  levocetirizine (XYZAL) 5 MG tablet, daily  , Disp: , Rfl:   •  levocetirizine (XYZAL) 5 MG tablet, TAKE ONE TABLET BY MOUTH EVERY DAY, Disp: 90 tablet, Rfl: 0  •  levocetirizine (XYZAL) 5 MG tablet, TAKE ONE TABLET BY MOUTH EVERY DAY, Disp: 90 tablet, Rfl: 0  •  multivitamin (THERAGRAN) TABS, Take by mouth, Disp: , Rfl:   •  OXYMETAZOLINE HCL, OPHTH, 0.025 % SOLN, 2 sprays into each nostril, Disp: , Rfl:   •  cyclobenzaprine (FLEXERIL) 10 mg tablet, Take 10 mg by mouth Three times daily as needed, Disp: , Rfl:   •  Diclofenac Epolamine 1.3 % PTCH, Every 12 hours, Disp: , Rfl:   •  losartan (COZAAR) 25 mg tablet, Take 12.5 mg by mouth daily, Disp: , Rfl:   •  TOUJEO SOLOSTAR injection pen 300 units/mL,  , Disp: , Rfl:   •  valACYclovir (VALTREX) 500 mg tablet, Take 1 tablet (500 mg total) by mouth daily, Disp: 90 tablet, Rfl: 3    Current Allergies     Allergies as of 07/03/2023 - Reviewed 07/03/2023   Allergen Reaction Noted   • Peanut oil - food allergy Anaphylaxis 07/03/2023   • Aspirin  07/27/2017   • Ciprofloxacin  09/13/2017   • Corticosteroids  02/12/2014   • Fenofibrate  07/27/2017   • Metformin  07/27/2017   • Penicillin g  09/13/2017   • Penicillins  02/12/2014   • Statins  02/12/2014            The following portions of the patient's history were reviewed and updated as appropriate: allergies, current medications, past family history, past medical history, past social history, past surgical history and problem list.     History reviewed. No pertinent past medical history. History reviewed.  No pertinent surgical history. History reviewed. No pertinent family history. Medications have been verified. Objective   /62   Pulse 80   Temp (!) 97 °F (36.1 °C)   Resp 18   Ht 6' 1" (1.854 m)   Wt 105 kg (231 lb 6.4 oz)   SpO2 97%   BMI 30.53 kg/m²        Physical Exam     Physical Exam  Vitals reviewed. Constitutional:       General: He is not in acute distress. Appearance: Normal appearance. He is not ill-appearing. Eyes:      Extraocular Movements: Extraocular movements intact. Conjunctiva/sclera: Conjunctivae normal.      Pupils: Pupils are equal, round, and reactive to light. Cardiovascular:      Rate and Rhythm: Normal rate and regular rhythm. Pulmonary:      Effort: Pulmonary effort is normal. No respiratory distress. Musculoskeletal:         General: Normal range of motion. Cervical back: Normal range of motion and neck supple. Skin:     Comments: Single macular erythematous 3 mm lesion noted on the right elbow volar surface. Neurological:      General: No focal deficit present. Mental Status: He is alert and oriented to person, place, and time. Coordination: Coordination normal.      Gait: Gait normal.   Psychiatric:         Mood and Affect: Mood normal.         Behavior: Behavior normal.         Thought Content:  Thought content normal.         Judgment: Judgment normal.

## 2023-10-17 ENCOUNTER — OFFICE VISIT (OUTPATIENT)
Age: 68
End: 2023-10-17
Payer: MEDICARE

## 2023-10-17 VITALS — HEIGHT: 73 IN | BODY MASS INDEX: 30.48 KG/M2 | WEIGHT: 230 LBS

## 2023-10-17 DIAGNOSIS — Z13.89 SCREENING FOR SKIN CONDITION: Primary | ICD-10-CM

## 2023-10-17 DIAGNOSIS — L40.9 PSORIASIS: ICD-10-CM

## 2023-10-17 DIAGNOSIS — D22.9 MULTIPLE NEVI: ICD-10-CM

## 2023-10-17 DIAGNOSIS — L82.1 SEBORRHEIC KERATOSIS: ICD-10-CM

## 2023-10-17 DIAGNOSIS — B00.9 HERPES SIMPLEX INFECTION OF SKIN: ICD-10-CM

## 2023-10-17 DIAGNOSIS — D18.01 CHERRY ANGIOMA: ICD-10-CM

## 2023-10-17 PROCEDURE — 99214 OFFICE O/P EST MOD 30 MIN: CPT | Performed by: DERMATOLOGY

## 2023-10-17 RX ORDER — VALACYCLOVIR HYDROCHLORIDE 500 MG/1
500 TABLET, FILM COATED ORAL DAILY
Qty: 90 TABLET | Refills: 3 | Status: SHIPPED | OUTPATIENT
Start: 2023-10-17 | End: 2024-10-11

## 2023-10-17 NOTE — PROGRESS NOTES
[facility] Note     Patient Name: Rachael Hua  Encounter Date: October 17, 2023     Have you been cared for by a [facility] in the last 3 years and, if so, which description applies to you? Yes. I have been here within the last 3 years, and my medical history has NOT changed since that time. I am MALE/not capable of bearing children. REVIEW OF SYSTEMS:  Have you recently had or currently have any of the following? No changes in my recent health. PAST MEDICAL HISTORY:  Have you personally ever had or currently have any of the following? If "YES," then please provide more detail. No changes in my medical history. FAMILY HISTORY:  Any "first degree relatives" (parent, brother, sister, or child) with the following? No changes in my family's known health. PATIENT EXPERIENCE:    Do you want the Dermatologist to perform a COMPLETE skin exam today including a clinical examination under the "bra and underwear" areas? Yes  If necessary, do we have your permission to call and leave a detailed message on your Preferred Phone number that includes your specific medical information?   Yes      Allergies   Allergen Reactions    Chocolate Hazelnut Flavor - Food Allergy Anaphylaxis    Nuts - Food Allergy Itching and Rash    Peanut Oil - Food Allergy Anaphylaxis    Aspirin Other (See Comments)     Other reaction(s): Unknown    Ciprofloxacin      Other reaction(s): (PIMS)    Corticosteroids     Dulaglutide Other (See Comments)     Upper abdominal pain and vomiting    Ezetimibe Other (See Comments)     Multiple side effects    Fenofibrate      Other reaction(s): (PIMS) severe myalgias    Metformin      Other reaction(s): (PIMS) GI distress    Penicillin G      Other reaction(s): (PIMS)    Penicillins     Statins       Current Outpatient Medications:     acetaminophen (TYLENOL) 325 mg tablet, Tylenol 325 mg tablet, Disp: , Rfl:     albuterol (PROVENTIL HFA,VENTOLIN HFA) 90 mcg/act inhaler, , Disp: , Rfl:     BD Pen Needle Keke U/F 32G X 4 MM MISC, , Disp: , Rfl:     canagliflozin (INVOKANA) 100 mg, Invokana 100 mg tablet, Disp: , Rfl:     cyclobenzaprine (FLEXERIL) 10 mg tablet, Take 10 mg by mouth Three times daily as needed, Disp: , Rfl:     desonide (DESOWEN) 0.05 % cream, Apply topically 2 (two) times a day, Disp: , Rfl:     fluticasone (FLONASE) 50 mcg/act nasal spray, 1 spray into each nostril 2 (two) times a day, Disp: 16 g, Rfl: 3    HYDROcodone-acetaminophen (NORCO) 7.5-325 mg per tablet, hydrocodone 7.5 mg-acetaminophen 325 mg tablet, Disp: , Rfl:     ibuprofen (MOTRIN) 800 mg tablet, Take by mouth, Disp: , Rfl:     insulin glargine (LANTUS SOLOSTAR) 100 units/mL injection pen, Inject under the skin daily, Disp: , Rfl:     insulin lispro (HumaLOG) 100 units/mL injection, Inject under the skin 3 (three) times a day before meals, Disp: , Rfl:     levocetirizine (XYZAL) 5 MG tablet, daily  , Disp: , Rfl:     levocetirizine (XYZAL) 5 MG tablet, TAKE ONE TABLET BY MOUTH EVERY DAY, Disp: 90 tablet, Rfl: 0    losartan (COZAAR) 25 mg tablet, Take 12.5 mg by mouth daily, Disp: , Rfl:     multivitamin (THERAGRAN) TABS, Take by mouth, Disp: , Rfl:     OXYMETAZOLINE HCL, OPHTH, 0.025 % SOLN, 2 sprays into each nostril, Disp: , Rfl:     levocetirizine (XYZAL) 5 MG tablet, TAKE ONE TABLET BY MOUTH EVERY DAY, Disp: 90 tablet, Rfl: 0    valACYclovir (VALTREX) 500 mg tablet, Take 1 tablet (500 mg total) by mouth daily, Disp: 90 tablet, Rfl: 3          Whom besides the patient is providing clinical information about today's encounter? NO ADDITIONAL HISTORIAN (patient alone provided history)    Physical Exam and Assessment/Plan by Diagnosis:    Chief complaint: Patient is a 77 y/o male present for a routine skin exam without history of skin cancer and no spots of concern for today.     MELANOCYTIC NEVI ("Moles")    Physical Exam:  Anatomic Location Affected: Mostly on sun-exposed areas of the trunk and extremities  Morphological Description:  Scattered, 1-4mm round to ovoid, symmetrical-appearing, even bordered, skin colored to dark brown macules/papules, mostly in sun-exposed areas  Pertinent Positives:  Pertinent Negatives: Additional History of Present Condition:  present on exam    Assessment and Plan:  Based on a thorough discussion of this condition and the management approach to it (including a comprehensive discussion of the known risks, side effects and potential benefits of treatment), the patient (family) agrees to implement the following specific plan:  Provided handout with information regarding the ABCDE's of moles   Recommend routine skin exams every year   Sun avoidance, protective clothing (known as UPF clothing), and the use of at least SPF 30 sunscreens is advised. Sunscreen should be reapplied every two hours when outside. SEBORRHEIC KERATOSIS; NON-INFLAMED    Physical Exam:  Anatomic Location Affected:  scattered across trunk, extremities, face  Morphological Description:  Flat and raised, waxy, smooth to warty textured, yellow to brownish-grey to dark brown to blackish, discrete, "stuck-on" appearing papules. Pertinent Positives:  Pertinent Negatives: Additional History of Present Condition:  Patient reports new bumps on the skin. Denies itch, burn, pain, bleeding or ulceration. Present constantly; nothing seems to make it worse or better. No prior treatment.       Assessment and Plan:  Based on a thorough discussion of this condition and the management approach to it (including a comprehensive discussion of the known risks, side effects and potential benefits of treatment), the patient (family) agrees to implement the following specific plan:  Reassured benign    ANGIOMA ("CHERRY ANGIOMA")    Physical Exam:  Anatomic Location: scattered across sun exposed areas of the trunk and extremities   Morphologic Description: Firm red to reddish-blue discrete papules  Pertinent Positives:  Pertinent Negatives: Additional History of Present Condition:  Present on exam.     Assessment and Plan:  Reassured benign    PSORIASIS    Physical Exam:  Anatomic Location Affected:  Right elbow and dorsum of hand  Morphological Description:  scaling erythematous patches  Severity: mild  Body Percent Affected: less than 1%  Pertinent Positives:  Pertinent Negatives: Additional History of Present Condition:  Currently using Desonide Cream as needed    Assessment and Plan:  Based on a thorough discussion of this condition and the management approach to it (including a comprehensive discussion of the known risks, side effects and potential benefits of treatment), the patient (family) agrees to implement the following specific plan:  Continue with Desonide Cream     Psoriasis is a chronic inflammatory condition that causes the body to make new skin cells in days rather than weeks. As these cells pile up on the surface of the skin, you may see thick, scaly patches of thickened skin. Psoriasis affects 2-4% of males and females. It can start at any age including childhood, with peaks of onset at 15-25 years and 50-60 years. It tends to persist lifelong, fluctuating in extent and severity. It is particularly common in Caucasians but may affect people of any race. About one-third of patients with psoriasis have family members with psoriasis. Psoriasis is multifactorial. It is classified as an immune-mediated inflammatory disease (IMID). Genetic factors are important and influence the type of psoriasis and response to treatment. HISTORY OF RECURRENT OF ERYTHEMA MULTIFORME   Physical Exam:  Anatomic Location Affected: generalized  Morphological Description:  not active at this time  Pertinent Positives:  Pertinent Negatives:     Additional History of Present Condition:  Has been on Chronic Suppressive therapy with Valacyclovir    Assessment and Plan:  Based on a thorough discussion of this condition and the management approach to it (including a comprehensive discussion of the known risks, side effects and potential benefits of treatment), the patient (family) agrees to implement the following specific plan:  Continue same therapy    Antoni Ramirez MD  PGY-II Dermatology Resident    Scribe Attestation      I,:  Steven Jimenez am acting as a scribe while in the presence of the attending physician.:       I,:  Maddy Youngblood MD personally performed the services described in this documentation    as scribed in my presence.:

## 2023-10-17 NOTE — PATIENT INSTRUCTIONS
Psoriasis is a chronic inflammatory condition that causes the body to make new skin cells in days rather than weeks. As these cells pile up on the surface of the skin, you may see thick, scaly patches of thickened skin. Psoriasis affects 2-4% of males and females. It can start at any age including childhood, with peaks of onset at 15-25 years and 50-60 years. It tends to persist lifelong, fluctuating in extent and severity. It is particularly common in Caucasians but may affect people of any race. About one-third of patients with psoriasis have family members with psoriasis. Psoriasis is multifactorial. It is classified as an immune-mediated inflammatory disease (IMID). Genetic factors are important and influence the type of psoriasis and response to treatment. What are the signs and symptoms of psoriasis? There are many different types of psoriasis that each have present uniquely. The types of psoriasis include:    Plaque psoriasis: About 80% to 90% of people who have psoriasis develop this type. When plaque psoriasis appears, you may see:  Plaque psoriasis usually presents with symmetrically distributed, red, scaly plaques with well-defined edges. The scale is typically silvery white, except in skin folds where the plaques often appear shiny and they may have a moist peeling surface. The most common sites are scalp, elbows and knees, but any part of the skin can be involved. The plaques are usually very persistent without treatment. Itch is mostly mild but may be severe in some patients, leading to scratching and lichenification (thickened leathery skin with increased skin markings). Painful skin cracks or fissures may occur. When psoriatic plaques clear up, they may leave brown or pale marks that can be expected to fade over several months. Guttate psoriasis: When someone gets this type of psoriasis, you often see tiny bumps appear on the skin quite suddenly.  The bumps tend to cover much of the torso, legs, and arms. Sometimes, the bumps also develop on the face, scalp, and ears. No matter where they appear, the bumps tend to be:   Small and scaly  [place]-colored to pink  Temporary, clearing in a few weeks or months without treatment  When guttate psoriasis clears, it may never return. Why this happens is still a bit of a mystery. Guttate psoriasis tends to develop in children and young adults who've had an infection, such as strep throat. It's possible that when the infection clears so does guttate psoriasis. It's also possible to have:  Guttate psoriasis for life  See the guttate psoriasis clear and plaque psoriasis develop later in life  Plaque psoriasis when you develop guttate psoriasis  There's no way to predict what will happen after the first flare-up of guttate psoriasis clears. Inverse psoriasis: This type of psoriasis develops in areas where skin touches skin, such as the armpits, genitals, and crease of the buttocks. Where the inverse psoriasis appears, you're likely to notice:  Smooth, red patches of skin that look raw  Little, if any, silvery-white coating  Sore or painful skin  Other names for this type of psoriasis are intertriginous psoriasis or flexural psoriasis. Pustular psoriasis: This type of psoriasis causes pus-filled bumps that usually appear only on the feet and hands. While the pus-filled bumps may look like an infection, the skin is not infected. The bumps don't contain bacteria or anything else that could cause an infection. Where pustular psoriasis appears, you tend to notice:  Red, swollen skin that is dotted with pus-filled bumps  Extremely sore or painful skin  Brown dots (and sometimes scale) appear as the pus-filled bumps dry  Pustular psoriasis can make just about any activity that requires your hands or feet, such as typing or walking, unbearably painful.     Pustular psoriasis (generalized): Serious and life-threatening, this rare type of psoriasis causes pus-filled bumps to develop on much of the skin. Also called von Zumbusch psoriasis, a flare-up causes this sequence of events:  Skin on most of the body suddenly turns dry, red, and tender. Within hours, pus-filled bumps cover most of the skin. Often within a day, the pus-filled bumps break open and pools of pus leak onto the skin. As the pus dries (usually within 24 to 48 hours), the skin dries out and peels (as shown in this picture). When the dried skin peels off, you see a smooth, glazed surface. In a few days or weeks, you may see a new crop of pus-filled bumps covering most of the skin, as the cycle repeats itself. Anyone with pustular psoriasis also feels very sick, and may develop a fever, headache, muscle weakness, and other symptoms. Medical care is often necessary to save the person's life. Erythrodermic psoriasis: Serious and life-threatening, this type of psoriasis requires immediate medical care. When someone develops erythrodermic psoriasis, you may notice:  Skin on most of the body looks burnt  Chills, fever, and the person looks extremely ill  Muscle weakness, a rapid pulse, and severe itch  The person may also be unable to keep warm, so hypothermia can set in quickly. Most people who develop this type of psoriasis already have another type of psoriasis. Before developing erythrodermic psoriasis, they often notice that their psoriasis is worsening or not improving with treatment. If you notice either of these happening, see a board-certified dermatologist.    Nails    Nail psoriasis: With any type of psoriasis, you may see changes to your fingernails or toenails. About half of the people who have plaque psoriasis see signs of psoriasis on their fingernails at some point2.   When psoriasis affects the nails, you may notice:  Tiny dents in your nails (called “nail pits”)  White, yellow, or brown discoloration under one or more nails  Crumbling, rough nails  A nail lifting up so that it's no longer attached  Buildup of skin cells beneath one or more nails, which lifts up the nail  Treatment and proper nail care can help you control nail psoriasis. Psoriatic arthritis: If you have psoriasis, it's important to pay attention to your joints. Some people who have psoriasis develop a type of arthritis called psoriatic arthritis. This is more likely to occur if you have severe psoriasis. Most people notice psoriasis on their skin years before they develop psoriatic arthritis. It's also possible to get psoriatic arthritis before psoriasis, but this is less common. When psoriatic arthritis develops, the signs can be subtle. At first, you may notice:  A swollen and tender joint, especially in a finger or toe  Heel pain  Swelling on the back of your leg, just above your heel  Stiffness in the morning that fades during the day  Like psoriasis, psoriatic arthritis cannot be cured. Treatment can prevent psoriatic arthritis from worsening, which is important. Allowed to progress, psoriatic arthritis can become disabling. Diagnosis and treatment of psoriasis   Psoriasis is usually diagnosed by clinical features, and skin biopsy if necessary. It is important to decrease factors that aggravate psoriasis. These include treating streptococcal infections, minimizing skin injuries, avoiding sun exposure if it exacerbates psoriasis, smoking, alcohol usage, decreasing stress, and maintaining an optimal body weight. Certain medications such as lithium, beta blockers, antimalarials, and NSAIDs have also been implicated. Suddenly stopping oral steroids or strong topical steroids can cause rebound disease. There are many categories of psoriasis treatments available. Topical therapy  Mild psoriasis is generally treated with topical agents alone. Which treatment is selected may depend on body site, extent and severity of psoriasis.   Emollients  Coal tar preparations  Dithranol  Salicylic acid  Vitamin D analogue (calcipotriol)  Topical corticosteroids  Calcineurin inhibitor (tacrolimus, pimecrolimus)  Phototherapy  Most psoriasis centres offer phototherapy with ultraviolet (UV) radiation, often in combination with topical or systemic agents. Types of phototherapy include  Narrowband UVB  Broadband UVB  Photochemotherapy (PUVA)  Targeted phototherapy  Systemic therapy  Moderate to severe psoriasis warrants treatment with a systemic agent and/or phototherapy. The most common treatments are:  Methotrexate  Ciclosporin  Acitretin  Other medicines occasionally used for psoriasis include:  Mycophenolate  Apremilast  Hydroxyurea  Azathioprine  6-mercaptopurine  Systemic corticosteroids are best avoided due to a risk of severe withdrawal flare of psoriasis and adverse effects. Biologics or targeted therapies are reserved for conventional treatment-resistant severe psoriasis, mainly because of expense, as side effects compare favorably with other systemic agents. These include:  Anti-tumour necrosis factor-alpha antagonists (anti-TNF?) infliximab, adalimumab and etanercept  The interleukin (IL)-12/23 antagonist ustekinumab  IL-17 antagonists such as secukinumab  Many other monoclonal antibodies are under investigation in the treatment of psoriasis. MELANOCYTIC NEVI ("Moles")    Melanocytic nevi ("moles") are tan or brown, raised or flat areas of the skin which have an increased number of melanocytes. Melanocytes are the cells in our body which make pigment and account for skin color. Some moles are present at birth (I.e., "congenital nevi"), while others come up later in life (i.e., "acquired nevi"). The sun can stimulate the body to make more moles. Sunburns are not the only thing that triggers more moles. Chronic sun exposure can do it too. Clinically distinguishing a healthy mole from melanoma may be difficult, even for experienced dermatologists.  The "ABCDE's" of moles have been suggested as a means of helping to alert a person to a suspicious mole and the possible increased risk of melanoma. The suggestions for raising alert are as follows:    Asymmetry: Healthy moles tend to be symmetric, while melanomas are often asymmetric. Asymmetry means if you draw a line through the mole, the two halves do not match in color, size, shape, or surface texture. Asymmetry can be a result of rapid enlargement of a mole, the development of a raised area on a previously flat lesion, scaling, ulceration, bleeding or scabbing within the mole. Any mole that starts to demonstrate "asymmetry" should be examined promptly by a board certified dermatologist.     Border: Healthy moles tend to have discrete, even borders. The border of a melanoma often blends into the normal skin and does not sharply delineate the mole from normal skin. Any mole that starts to demonstrate "uneven borders" should be examined promptly by a board certified dermatologist.     Color: Healthy moles tend to be one color throughout. Melanomas tend to be made up of different colors ranging from dark black, blue, white, or red. Any mole that demonstrates a color change should be examined promptly by a board certified dermatologist.     Diameter: Healthy moles tend to be smaller than 0.6 cm in size; an exception are "congenital nevi" that can be larger. Melanomas tend to grow and can often be greater than 0.6 cm (1/4 of an inch, or the size of a pencil eraser). This is only a guideline, and many normal moles may be larger than 0.6 cm without being unhealthy. Any mole that starts to change in size (small to bigger or bigger to smaller) should be examined promptly by a board certified dermatologist.     Evolving: Healthy moles tend to "stay the same."  Melanomas may often show signs of change or evolution such as a change in size, shape, color, or elevation.   Any mole that starts to itch, bleed, crust, burn, hurt, or ulcerate or demonstrate a change or evolution should be examined promptly by a board certified dermatologist.      Dysplastic Nevi  Dysplastic moles are moles that fit the ABCDE rules of melanoma but are not identified as melanomas when examined under the microscope. They may indicate an increased risk of melanoma in that person. If there is a family history of melanoma, most experts agree that the person may be at an increased risk for developing a melanoma. Experts still do not agree on what dysplastic moles mean in patients without a personal or family history of melanoma. Dysplastic moles are usually larger than common moles and have different colors within it with irregular borders. The appearance can be very similar to a melanoma. Biopsies of dysplastic moles may show abnormalities which are different from a regular mole. Melanoma  Malignant melanoma is a type of skin cancer that can be deadly if it spreads throughout the body. The incidence of melanoma in the [facility] is growing faster than any other cancer. Melanoma usually grows near the surface of the skin for a period of time, and then begins to grow deeper into the skin. Once it grows deeper into the skin, the risk of spread to other organs greatly increases. Therefore, early detection and removal of a malignant melanoma may result in a better chance at a complete cure; removal after the tumor has spread may not be as effective, leading to worse clinical outcomes such as death. The true rate of nevus transformation into a melanoma is unknown. It has been estimated that the lifetime risk for any acquired melanocytic nevus on any 21year-old individual transforming into melanoma by age 80 is 0.03% (1 in 3,164) for men and 0.009% (1 in 10,800) for women. The appearance of a "new mole" remains one of the most reliable methods for identifying a malignant melanoma.   Occasionally, melanomas appear as rapidly growing, blue-black, dome-shaped bumps within a previous mole or previous area of normal skin. Other times, melanomas are suspected when a mole suddenly appears or changes. Itching, burning, or pain in a pigmented lesion should increase suspicion, but most patients with early melanoma have no skin discomfort whatsoever. Melanoma can occur anywhere on the skin, including areas that are difficult for self-examination. Many melanomas are first noticed by other family members. Suspicious-looking moles may be removed for microscopic examination. You may be able to prevent death from melanoma by doing two simple things:    Try to avoid unnecessary sun exposure and protect your skin when it is exposed to the sun. People who live near the equator, people who have intermittent exposures to large amounts of sun, and people who have had sunburns in childhood or adolescence have an increased risk for melanoma. Sun sense and vigilant sun protection may be keys to helping to prevent melanoma. We recommend wearing UPF-rated sun protective clothing and sunglasses whenever possible and applying a moisturizer-sunscreen combination product (SPF 50+) such as Neutrogena Daily Defense to sun exposed areas of skin at least three times a day. Have your moles regularly examined by a board certified dermatologist AND by yourself or a family member/friend at home. We recommend that you have your moles examined at least once a year by a board certified dermatologist.  Use your birthday as an annual reminder to have your "Birthday Suit" (I.e., your skin) examined; it is a nice birthday gift to yourself to know that your skin is healthy appearing! Additionally, at-home self examinations may be helpful for detecting a possible melanoma. Use the ABCDEs we discussed and check your moles once a month at home. SEBORRHEIC KERATOSIS  A seborrheic keratosis is a harmless warty spot that appears during adult life as a common sign of skin aging.   Seborrheic keratoses can arise on any area of skin, covered or uncovered, with the usual exception of the palms and soles. They do not arise from mucous membranes. Seborrheic keratoses can have highly variable appearance. Seborrheic keratoses are extremely common. It has been estimated that over 90% of adults over the age of 61 years have one or more of them. They occur in males and females of all races, typically beginning to erupt in the 35s or 45s. They are uncommon under the age of 21 years. The precise cause of seborrhoeic keratoses is not known. Seborrhoeic keratoses are considered degenerative in nature. As time goes by, seborrheic keratoses tend to become more numerous. Some people inherit a tendency to develop a very large number of them; some people may have hundreds of them. The name "seborrheic keratosis" is misleading, because these lesions are not limited to a seborrhoeic distribution (scalp, mid-face, chest, upper back), nor are they formed from sebaceous glands, nor are they associated with sebum -- which is greasy. Seborrheic keratosis may also be called "SK," "Seb K," "basal cell papilloma," "senile wart," or "barnacle."      There is no easy way to remove multiple lesions on a single occasion. Unless a specific lesion is "inflamed" and is causing pain or stinging/burning or is bleeding, most insurance companies do not authorize treatment. ANGIOMA ("CHERRY ANGIOMA")  Ly angiomas markedly increase in number from about the age of 36, so it has been estimated that 75% of people over 76years of age have them. Although they also called "senile angiomas," they can occur in young people too - 5% of adolescents have been found to have them. Cherry angiomas are very common in males and females of any age or race, with no difference in sexes or races affected. They are however more noticeable in white skin than in skin of colour. There may be a family history of similar lesions.  Eruptive (very large number appearing in a short period of time) cherry angiomas have been rarely reported to be associated with internal malignancy and pregnancy.

## 2024-01-22 ENCOUNTER — TELEPHONE (OUTPATIENT)
Age: 69
End: 2024-01-22

## 2024-01-22 NOTE — TELEPHONE ENCOUNTER
Needed a 10 day discharge appointment but Dr Foy will be out of the office til 02/05 and As per Yissel Nurse  from [facility] it's ok for patient to wait to see Dr Foy on 02/05th patient will be taking care of site on his own..CHARLIE

## 2024-02-21 PROBLEM — Z13.89 SCREENING FOR SKIN CONDITION: Status: RESOLVED | Noted: 2018-04-13 | Resolved: 2024-02-21

## 2024-04-01 ENCOUNTER — APPOINTMENT (EMERGENCY)
Dept: CT IMAGING | Facility: HOSPITAL | Age: 69
End: 2024-04-01
Payer: MEDICARE

## 2024-04-01 ENCOUNTER — HOSPITAL ENCOUNTER (EMERGENCY)
Facility: HOSPITAL | Age: 69
Discharge: HOME | End: 2024-04-01
Attending: EMERGENCY MEDICINE
Payer: MEDICARE

## 2024-04-01 VITALS
TEMPERATURE: 97.8 F | RESPIRATION RATE: 18 BRPM | HEART RATE: 74 BPM | OXYGEN SATURATION: 97 % | DIASTOLIC BLOOD PRESSURE: 81 MMHG | SYSTOLIC BLOOD PRESSURE: 183 MMHG

## 2024-04-01 DIAGNOSIS — G45.9 TIA (TRANSIENT ISCHEMIC ATTACK): Primary | ICD-10-CM

## 2024-04-01 LAB
ALBUMIN SERPL BCP-MCNC: 3.9 G/DL (ref 3.5–5)
ALP SERPL-CCNC: 75 U/L (ref 34–104)
ALT SERPL W P-5'-P-CCNC: 25 U/L (ref 7–52)
ANION GAP SERPL CALCULATED.3IONS-SCNC: 6 MMOL/L (ref 4–13)
AST SERPL W P-5'-P-CCNC: 21 U/L (ref 13–39)
ATRIAL RATE: 80 BPM
ATRIAL RATE: 85 BPM
BASOPHILS # BLD AUTO: 0.07 THOUSANDS/ÂΜL (ref 0–0.1)
BASOPHILS NFR BLD AUTO: 1 % (ref 0–1)
BILIRUB SERPL-MCNC: 0.43 MG/DL (ref 0.2–1)
BUN SERPL-MCNC: 19 MG/DL (ref 5–25)
CALCIUM SERPL-MCNC: 9.5 MG/DL (ref 8.4–10.2)
CARDIAC TROPONIN I PNL SERPL HS: 4 NG/L
CHLORIDE SERPL-SCNC: 103 MMOL/L (ref 96–108)
CO2 SERPL-SCNC: 27 MMOL/L (ref 21–32)
CREAT SERPL-MCNC: 0.63 MG/DL (ref 0.6–1.3)
EOSINOPHIL # BLD AUTO: 0.15 THOUSAND/ÂΜL (ref 0–0.61)
EOSINOPHIL NFR BLD AUTO: 2 % (ref 0–6)
ERYTHROCYTE [DISTWIDTH] IN BLOOD BY AUTOMATED COUNT: 12.1 % (ref 11.6–15.1)
GFR SERPL CREATININE-BSD FRML MDRD: 100 ML/MIN/1.73SQ M
GLUCOSE SERPL-MCNC: 120 MG/DL (ref 65–140)
GLUCOSE SERPL-MCNC: 147 MG/DL (ref 65–140)
HCT VFR BLD AUTO: 44.7 % (ref 36.5–49.3)
HGB BLD-MCNC: 15.8 G/DL (ref 12–17)
IMM GRANULOCYTES # BLD AUTO: 0.06 THOUSAND/UL (ref 0–0.2)
IMM GRANULOCYTES NFR BLD AUTO: 1 % (ref 0–2)
LYMPHOCYTES # BLD AUTO: 1.38 THOUSANDS/ÂΜL (ref 0.6–4.47)
LYMPHOCYTES NFR BLD AUTO: 18 % (ref 14–44)
MCH RBC QN AUTO: 31.8 PG (ref 26.8–34.3)
MCHC RBC AUTO-ENTMCNC: 35.3 G/DL (ref 31.4–37.4)
MCV RBC AUTO: 90 FL (ref 82–98)
MONOCYTES # BLD AUTO: 0.69 THOUSAND/ÂΜL (ref 0.17–1.22)
MONOCYTES NFR BLD AUTO: 9 % (ref 4–12)
NEUTROPHILS # BLD AUTO: 5.43 THOUSANDS/ÂΜL (ref 1.85–7.62)
NEUTS SEG NFR BLD AUTO: 69 % (ref 43–75)
NRBC BLD AUTO-RTO: 0 /100 WBCS
P AXIS: 47 DEGREES
P AXIS: 57 DEGREES
PLATELET # BLD AUTO: 172 THOUSANDS/UL (ref 149–390)
PMV BLD AUTO: 9.8 FL (ref 8.9–12.7)
POTASSIUM SERPL-SCNC: 4.4 MMOL/L (ref 3.5–5.3)
PR INTERVAL: 160 MS
PR INTERVAL: 166 MS
PROT SERPL-MCNC: 7.3 G/DL (ref 6.4–8.4)
QRS AXIS: 55 DEGREES
QRS AXIS: 58 DEGREES
QRSD INTERVAL: 82 MS
QRSD INTERVAL: 84 MS
QT INTERVAL: 342 MS
QT INTERVAL: 356 MS
QTC INTERVAL: 406 MS
QTC INTERVAL: 410 MS
RBC # BLD AUTO: 4.97 MILLION/UL (ref 3.88–5.62)
SODIUM SERPL-SCNC: 136 MMOL/L (ref 135–147)
T WAVE AXIS: 40 DEGREES
T WAVE AXIS: 46 DEGREES
VENTRICULAR RATE: 80 BPM
VENTRICULAR RATE: 85 BPM
WBC # BLD AUTO: 7.78 THOUSAND/UL (ref 4.31–10.16)

## 2024-04-01 PROCEDURE — 93005 ELECTROCARDIOGRAM TRACING: CPT

## 2024-04-01 PROCEDURE — 36415 COLL VENOUS BLD VENIPUNCTURE: CPT | Performed by: PHYSICIAN ASSISTANT

## 2024-04-01 PROCEDURE — 84484 ASSAY OF TROPONIN QUANT: CPT | Performed by: PHYSICIAN ASSISTANT

## 2024-04-01 PROCEDURE — 82948 REAGENT STRIP/BLOOD GLUCOSE: CPT

## 2024-04-01 PROCEDURE — 93010 ELECTROCARDIOGRAM REPORT: CPT | Performed by: INTERNAL MEDICINE

## 2024-04-01 PROCEDURE — 99285 EMERGENCY DEPT VISIT HI MDM: CPT | Performed by: EMERGENCY MEDICINE

## 2024-04-01 PROCEDURE — 70498 CT ANGIOGRAPHY NECK: CPT

## 2024-04-01 PROCEDURE — 99285 EMERGENCY DEPT VISIT HI MDM: CPT

## 2024-04-01 PROCEDURE — 80053 COMPREHEN METABOLIC PANEL: CPT | Performed by: PHYSICIAN ASSISTANT

## 2024-04-01 PROCEDURE — 99244 OFF/OP CNSLTJ NEW/EST MOD 40: CPT | Performed by: INTERNAL MEDICINE

## 2024-04-01 PROCEDURE — 70496 CT ANGIOGRAPHY HEAD: CPT

## 2024-04-01 PROCEDURE — 85025 COMPLETE CBC W/AUTO DIFF WBC: CPT | Performed by: PHYSICIAN ASSISTANT

## 2024-04-01 RX ADMIN — IOHEXOL 85 ML: 350 INJECTION, SOLUTION INTRAVENOUS at 12:46

## 2024-04-01 NOTE — CONSULTS
Inpatient Medical Consultation - [facility]    Patient Information: Santino Cooney 69 y.o. male MRN: [id number]  Unit/Bed#: ED 13 Encounter: [id number]  PCP: Percy Lee MD  Date of Admission:  4/1/2024  Date of Consultation: 04/01/24  Requesting Physician: Jimmie Angulo MD    Reason For Consultation:   Strokelike symptoms    Assessment/Plan:    Strokelike symptoms  - Presented with left facial droop, left-sided tunnel vision and gait imbalance started 4 days prior to presentation and resolved after 3 to 4 hours.  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  Facial deficits have resolved at that time as well.  Currently denies any neurodeficit.  No gait imbalance currently noted.  Vision appears normal.  No facial nerve deficit.  Cranial nerve examination and\neurological examination appears to be unremarkable.  Does follow-up with neurology and vascular surgery outpatient.    Plan:-  - Given symptoms have resolved and patient is already on dual antiplatelet therapy.  Imaging showing stable finding with no acute findings.  Patient not having any neurodeficit at this point.  Low suspicion for stroke.  Will need outpatient neurology follow-up and vascular surgery follow-up.  Continue current management with aspirin, Plavix, statin.      Counseling / Coordination of Care Time: 45 minutes.  Greater than 50% of total time spent on patient counseling and coordination of care.    Collaboration of Care: Were Recommendations Directly Discussed with Primary Treatment Team? - Yes     History of Present Illness:    Santino Cooney is a 69 y.o. male who is originally admitted to the ED service on 4/1/2024 due to left facial droop, left-sided tunnel vision with gait imbalance started around 4 to 5 days ago resolved after 3 to 4 hours.  Does have history of CVA recently diagnosed with left medullary stroke and extensive stroke workup done at that time  including MRI brain which confirmed finding.  Had chronic intracranial and extracranial vascular stenosis with repeat imaging done showing stable finding.  Symptoms have resolved currently.  Back to baseline.  No gait imbalance.  Vision appears normal.  No facial deficit.  No dysphagia, dyspnea, dysphonia.  Was able to verbalize well.  Denies any complaints.  Compliant with medication.  Does follow-up with neurology and vascular surgery outpatient..   We are consulted for strokelike symptoms.    Review of Systems:    Review of Systems   Constitutional:  Negative for activity change, appetite change, chills, diaphoresis, fatigue, fever and unexpected weight change.   HENT:  Negative for rhinorrhea and sore throat.    Eyes:  Negative for visual disturbance.   Respiratory:  Negative for cough, chest tightness and shortness of breath.    Cardiovascular:  Negative for chest pain, palpitations and leg swelling.   Gastrointestinal:  Negative for abdominal distention, abdominal pain, blood in stool, constipation, diarrhea, nausea and vomiting.   Genitourinary:  Negative for difficulty urinating.   Musculoskeletal:  Positive for gait problem. Negative for arthralgias.   Neurological:  Positive for facial asymmetry. Negative for headaches.   Psychiatric/Behavioral:  Negative for behavioral problems and sleep disturbance.        Past Medical and Surgical History:     History reviewed. No pertinent past medical history.    History reviewed. No pertinent surgical history.    Meds/Allergies:    all medications and allergies reviewed    Allergies:   Allergies   Allergen Reactions    Chocolate Hazelnut Flavor - Food Allergy Anaphylaxis    Nuts - Food Allergy Itching and Rash    Peanut Oil - Food Allergy Anaphylaxis    Aspirin Other (See Comments)     Other reaction(s): Unknown    Ciprofloxacin      Other reaction(s): (PIMS)    Corticosteroids     Dulaglutide Other (See Comments)     Upper abdominal pain and vomiting    Ezetimibe  Other (See Comments)     Multiple side effects    Fenofibrate      Other reaction(s): (PIMS) severe myalgias    Metformin      Other reaction(s): (PIMS) GI distress    Penicillin G      Other reaction(s): (PIMS)    Penicillins     Statins        Social History:     Marital Status: Single    Substance Use History:   Social History     Substance and Sexual Activity   Alcohol Use Never     Social History     Tobacco Use   Smoking Status Never   Smokeless Tobacco Never     Social History     Substance and Sexual Activity   Drug Use Never       Family History:    History reviewed. No pertinent family history.    Physical Exam:     Vitals:   Blood Pressure: 125/85 (04/01/24 1400)  Pulse: 73 (04/01/24 1400)  Temperature: 97.8 °F (36.6 °C) (04/01/24 1300)  Temp Source: Oral (04/01/24 1300)  Respirations: 21 (04/01/24 1400)  SpO2: 96 % (04/01/24 1400)    Physical Exam  Vitals reviewed.   Constitutional:       General: He is not in acute distress.     Appearance: He is well-developed.   HENT:      Head: Normocephalic and atraumatic.   Eyes:      General: No scleral icterus.        Right eye: No discharge.         Left eye: No discharge.   Cardiovascular:      Rate and Rhythm: Normal rate and regular rhythm.      Pulses: Normal pulses.      Heart sounds: Normal heart sounds.   Pulmonary:      Effort: Pulmonary effort is normal. No respiratory distress.      Breath sounds: Normal breath sounds. No wheezing or rales.   Chest:      Chest wall: No tenderness.   Abdominal:      General: Bowel sounds are normal. There is no distension.      Palpations: Abdomen is soft.      Tenderness: There is no abdominal tenderness.   Musculoskeletal:         General: No swelling or tenderness. Normal range of motion.      Cervical back: Normal range of motion.      Right lower leg: No edema.      Left lower leg: No edema.   Skin:     General: Skin is warm.      Coloration: Skin is not pale.   Neurological:      General: No focal deficit present.       Mental Status: He is alert and oriented to person, place, and time. Mental status is at baseline.      Cranial Nerves: No cranial nerve deficit.      Sensory: No sensory deficit.      Motor: No weakness.   Psychiatric:         Mood and Affect: Mood normal.         Behavior: Behavior normal.         Additional Data:     Lab Results: I have personally reviewed pertinent reports.      Results from last 7 days   Lab Units 04/01/24  1112   WBC Thousand/uL 7.78   HEMOGLOBIN g/dL 15.8   HEMATOCRIT % 44.7   PLATELETS Thousands/uL 172   NEUTROS PCT % 69   LYMPHS PCT % 18   MONOS PCT % 9   EOS PCT % 2     Results from last 7 days   Lab Units 04/01/24  1205   POTASSIUM mmol/L 4.4   CHLORIDE mmol/L 103   CO2 mmol/L 27   BUN mg/dL 19   CREATININE mg/dL 0.63   CALCIUM mg/dL 9.5   ALK PHOS U/L 75   ALT U/L 25   AST U/L 21           Imaging: I have personally reviewed pertinent reports.      CTA head and neck with and without contrast    Result Date: 4/1/2024  Narrative: CTA NECK AND BRAIN WITH AND WITHOUT CONTRAST INDICATION: Transient L sided facial droop and tunnel vision 2 days ago, hx of stroke COMPARISON:   Report for outside CTA dated 2/2/2024. Images are unavailable for direct comparison. TECHNIQUE:  Routine CT imaging of the Brain without contrast.  Post contrast imaging was performed after administration of iodinated contrast through the neck and brain. Post contrast axial 0.625 mm images timed to opacify the arterial system. 3D rendering was performed on an independent workstation.   MIP reconstructions performed. Coronal reconstructions were performed of the noncontrast portion of the brain. Radiation dose length product (DLP) for this visit:  1512 mGy-cm .  This examination, like all CT scans performed in the [facility], was performed utilizing techniques to minimize radiation dose exposure, including the use of iterative reconstruction and automated exposure control. IV Contrast:  85 mL of  iohexol (OMNIPAQUE) IMAGE QUALITY:   Diagnostic FINDINGS: NONCONTRAST BRAIN PARENCHYMA:  No intracranial mass, mass effect or midline shift. No CT signs of acute infarction.  No acute parenchymal hemorrhage. VENTRICLES AND EXTRA-AXIAL SPACES:  Normal for the patient's age. VISUALIZED ORBITS: Normal visualized orbits. PARANASAL SINUSES: Mild paranasal sinus mucosal thickening. CERVICAL VASCULATURE AORTIC ARCH AND GREAT VESSELS: Mild atherosclerotic disease of the arch, proximal great vessels and visualized subclavian vessels.  No significant stenosis. RIGHT VERTEBRAL ARTERY CERVICAL SEGMENT: Dominant. Mild to moderate stenosis at the origin. The vessel is otherwise normal in caliber throughout the neck. LEFT VERTEBRAL ARTERY CERVICAL SEGMENT: Proximal occlusion. There is reconstitution of diminutive V2 segment at the level of C4-5. Partial visualization of diminutive V3 segment that is occluded distally. RIGHT EXTRACRANIAL CAROTID SEGMENT: Moderate atherosclerotic disease at the bifurcation and proximal ICA with approximately 60% stenosis. No dissection. LEFT EXTRACRANIAL CAROTID SEGMENT: Severe atherosclerosis of the bifurcation and proximal ICA with ulcerated partially calcified plaque causing severe (approximately 80% stenosis). There is mild smooth narrowing of the distal cervical ICA.. NASCET criteria was used to determine the degree of internal carotid artery diameter stenosis. INTRACRANIAL VASCULATURE INTERNAL CAROTID ARTERIES: There is moderate to severe atherosclerotic disease in the cavernous and supraclinoid internal carotid arteries. ANTERIOR CIRCULATION: Left A1 segment is hypoplastic. No significant stenosis or occlusion. Normal anterior communicating artery. MIDDLE CEREBRAL ARTERY CIRCULATION:  M1 segment and middle cerebral artery branches demonstrate normal enhancement bilaterally. DISTAL VERTEBRAL ARTERIES: No significant stenosis of the dominant right vertebral artery. Proximal occlusion of  hypoplastic left V4 segment. Reconstitution of diminutive vessel at the level of patent PICA via retrograde flow. Bilateral posterior inferior cerebral arteries are patent. BASILAR ARTERY:  Basilar artery is normal in caliber.  Normal superior cerebellar arteries. POSTERIOR CEREBRAL ARTERIES: Both posterior cerebral arteries arises from the basilar tip. There is stenosis in the right P2 segment. Mild atherosclerotic disease in the left PCA without high-grade stenosis. VENOUS STRUCTURES:  Normal. NON VASCULAR ANATOMY BONY STRUCTURES:  No acute osseous abnormality. SOFT TISSUES OF THE NECK:  Unremarkable. THORACIC INLET: Emphysematous changes. Tiny nodules in the upper lobes measuring less than 3 mm in the right upper lobe on image 264 and left upper lobe on image 306 of series 4.     Impression: No acute intracranial pathology. Severe stenosis proximal left internal carotid artery with ulcerated plaque Moderate stenosis proximal right internal carotid artery Proximal occlusion of hypoplastic left vertebral artery with segmental reconstitution. Moderate to severe stenosis in the intracranial internal carotid arteries. Stenosis right PCA. 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 Findings are similar to outside CTA report. The study was marked in EPIC for immediate notification. Workstation performed: [id number]     CT chest wo contrast    Result Date: 3/8/2024  Narrative: History: Shortness of breath (Ped 0-17y) Additional history obtained from electronic medical record/by technologist: None. Technique: Using helical technique, axial images were obtained through the chest without  Omnipaque 350 intravenous contrast. Comparison: Chest x-ray dated 10/29/2023. Findings: Mediastinum/Lymph nodes: Circumferential distal esophageal wall thickening. No enlarged mediastinal or hilar lymph nodes  by size criteria. Heart: Coronary and aortic atherosclerotic calcifications. Pulmonary Vessels: Normal. Upper Abdomen: Gallstones. 1.4 cm fluid attenuating benign simple cysts within left upper pole kidney. Axilla: Normal. Chest Wall: Normal. Pleura: No pleural effusion or pneumothorax. Lungs: Upper lobe predominant centrilobular and paraseptal emphysema. Tree-in-bud nodular opacities within the right middle lobe with atelectasis. Lingular atelectasis. Tree-in-bud groundglass opacities in the left lobe including 8 mm subsolid nodule within the left lower lobe (series 3 image 113). Tree-in-bud nodular opacities with atelectasis/scarring within the left lower lobe (series 3 image 128). Mild bronchial wall thickening. Bones: No destructive osseous lesions. Multilevel disc and facet degeneration.    Impression: Impression: 1.  Tree-in-bud nodular opacities within the right middle lobe, lingula and left lower lobe, infectious/inflammatory in etiology. No focal consolidation. 2.  8 mm subsolid nodule within the left lower lobe, also most likely infectious/inflammatory in etiology. 3.  Circumferential distal esophageal wall thickening, most likely due to chronic reflux esophagitis. Correlation with upper endoscopy can be performed to be sure that this is benign and not malignant. 4.  Gallstones.  5.  Atherosclerosis. 6.  Significant Findings: PA Act 112. Workstation:[id number]      EKG, Pathology, and Other Studies Reviewed on Admission:   EKG: Normal sinus rhythm    ** Please Note: This note has been constructed using a voice recognition system. **

## 2024-04-01 NOTE — DISCHARGE INSTRUCTIONS
Continue taking aspirin and Plavix.    Please follow-up with your family doctor and vascular surgeon. Return to the ER immediately with any new or worsening symptoms.

## 2024-04-01 NOTE — ED PROVIDER NOTES
"History  Chief Complaint   Patient presents with    CVA/TIA-like Symptoms     Pt presents with concerns that he had a TIA, states Saturday at 0800 he noticed the L side facial droop with \"tunnel vision\" that resolved around 1200. History of stroke in the past. Denies any symptoms today.      68yo male with a history of prior stroke, insulin-dependent type 2 diabetes, and hypertension presenting for evaluation of a transient left facial droop. He was in his kitchen 2 days ago after eating breakfast and he started to feel off balance. This was followed by bilateral tunnel vision and a left facial droop. Symptoms lasted about 4 hours before resolving. He called his vascular surgeon today who advised him to go to the ED for evaluation. Patient was admitted at [facility] in 2023 for a stroke. He has residual right sided numbness. He is currently on aspirin and was started on Plavix last week.       History provided by:  Patient   used: No    CVA/TIA-like Symptoms  Presenting symptoms: no confusion    Associated symptoms: no chest pain, no fever, no neck pain, no seizures and no vomiting        Prior to Admission Medications   Prescriptions Last Dose Informant Patient Reported? Taking?   BD Pen Needle Keke U/F 32G X 4 MM MISC  Self Yes No   HYDROcodone-acetaminophen (NORCO) 7.5-325 mg per tablet  Self Yes No   Sig: hydrocodone 7.5 mg-acetaminophen 325 mg tablet   OXYMETAZOLINE HCL, OPHTH, 0.025 % SOLN  Self Yes No   Si sprays into each nostril   acetaminophen (TYLENOL) 325 mg tablet  Self Yes No   Sig: Tylenol 325 mg tablet   albuterol (PROVENTIL HFA,VENTOLIN HFA) 90 mcg/act inhaler  Self Yes No   canagliflozin (INVOKANA) 100 mg  Self Yes No   Sig: Invokana 100 mg tablet   cyclobenzaprine (FLEXERIL) 10 mg tablet  Self Yes No   Sig: Take 10 mg by mouth Three times daily as needed   desonide (DESOWEN) 0.05 % cream  Self Yes No   Sig: Apply topically 2 (two) times a day "   fluticasone (FLONASE) 50 mcg/act nasal spray   No No   Si spray into each nostril 2 (two) times a day   ibuprofen (MOTRIN) 800 mg tablet  Self Yes No   Sig: Take by mouth   insulin glargine (LANTUS SOLOSTAR) 100 units/mL injection pen  Self Yes No   Sig: Inject under the skin daily   insulin lispro (HumaLOG) 100 units/mL injection  Self Yes No   Sig: Inject under the skin 3 (three) times a day before meals   levocetirizine (XYZAL) 5 MG tablet  Self Yes No   Sig: daily     levocetirizine (XYZAL) 5 MG tablet   No No   Sig: TAKE ONE TABLET BY MOUTH EVERY DAY   levocetirizine (XYZAL) 5 MG tablet   No No   Sig: TAKE ONE TABLET BY MOUTH EVERY DAY   losartan (COZAAR) 25 mg tablet  Self Yes No   Sig: Take 12.5 mg by mouth daily   multivitamin (THERAGRAN) TABS  Self Yes No   Sig: Take by mouth   valACYclovir (VALTREX) 500 mg tablet   No No   Sig: Take 1 tablet (500 mg total) by mouth daily      Facility-Administered Medications: None       History reviewed. No pertinent past medical history.    History reviewed. No pertinent surgical history.    History reviewed. No pertinent family history.  I have reviewed and agree with the history as documented.    E-Cigarette/Vaping     E-Cigarette/Vaping Substances     Social History     Tobacco Use    Smoking status: Never    Smokeless tobacco: Never   Substance Use Topics    Alcohol use: Never    Drug use: Never       Review of Systems   Constitutional:  Negative for chills and fever.   HENT:  Negative for drooling and voice change.    Eyes:  Negative for discharge and redness.   Respiratory:  Negative for shortness of breath and stridor.    Cardiovascular:  Negative for chest pain and leg swelling.   Gastrointestinal:  Negative for abdominal pain and vomiting.   Musculoskeletal:  Negative for neck pain and neck stiffness.   Skin:  Negative for color change and rash.   Neurological:  Positive for facial asymmetry (resolved). Negative for seizures and syncope.    Psychiatric/Behavioral:  Negative for confusion. The patient is not nervous/anxious.    All other systems reviewed and are negative.      Physical Exam  Physical Exam  Vitals and nursing note reviewed.   Constitutional:       General: He is not in acute distress.     Appearance: He is well-developed. He is not diaphoretic.   HENT:      Head: Normocephalic and atraumatic.      Right Ear: External ear normal.      Left Ear: External ear normal.   Eyes:      General: No scleral icterus.        Right eye: No discharge.         Left eye: No discharge.      Extraocular Movements: Extraocular movements intact.      Conjunctiva/sclera: Conjunctivae normal.      Pupils: Pupils are equal, round, and reactive to light.   Cardiovascular:      Rate and Rhythm: Normal rate and regular rhythm.      Heart sounds: Normal heart sounds. No murmur heard.  Pulmonary:      Effort: Pulmonary effort is normal. No respiratory distress.      Breath sounds: Normal breath sounds. No stridor. No wheezing or rales.   Musculoskeletal:         General: No deformity. Normal range of motion.      Cervical back: Normal range of motion and neck supple.   Skin:     General: Skin is warm and dry.   Neurological:      General: No focal deficit present.      Mental Status: He is alert. He is not disoriented.      GCS: GCS eye subscore is 4. GCS verbal subscore is 5. GCS motor subscore is 6.      Cranial Nerves: No cranial nerve deficit.      Motor: No weakness.      Coordination: Coordination normal.      Comments: Sensation decreased in R arm and R leg which is baseline. CN 2-12 intact. PERRL. EOMs intact. Visual fields normal. No dysarthria. 5/5 strength in all extremities. Negative drift x4. Normal finger to nose and heel to shin bilaterally.    Psychiatric:         Behavior: Behavior normal.         Vital Signs  ED Triage Vitals [04/01/24 1044]   Temperature Pulse Respirations Blood Pressure SpO2   97.8 °F (36.6 °C) 89 22 124/70 93 %      Temp  Source Heart Rate Source Patient Position - Orthostatic VS BP Location FiO2 (%)   Temporal Monitor Sitting Left arm --      Pain Score       --           Vitals:    04/01/24 1330 04/01/24 1400 04/01/24 1430 04/01/24 1530   BP: 138/63 125/85 (!) 180/67 (!) 183/81   Pulse: 73 73 72 74   Patient Position - Orthostatic VS: Sitting Sitting Sitting Sitting         Visual Acuity  Visual Acuity      Flowsheet Row Most Recent Value   L Pupil Size (mm) 3   R Pupil Size (mm) 3            ED Medications  Medications   iohexol (OMNIPAQUE) 350 MG/ML injection (MULTI-DOSE) 85 mL (85 mL Intravenous Given 4/1/24 1246)       Diagnostic Studies  Results Reviewed       Procedure Component Value Units Date/Time    Comprehensive metabolic panel [[id number]] Collected: 04/01/24 1205    Lab Status: Final result Specimen: Blood from Hand, Left Updated: 04/01/24 1229     Sodium 136 mmol/L      Potassium 4.4 mmol/L      Chloride 103 mmol/L      CO2 27 mmol/L      ANION GAP 6 mmol/L      BUN 19 mg/dL      Creatinine 0.63 mg/dL      Glucose 120 mg/dL      Calcium 9.5 mg/dL      AST 21 U/L      ALT 25 U/L      Alkaline Phosphatase 75 U/L      Total Protein 7.3 g/dL      Albumin 3.9 g/dL      Total Bilirubin 0.43 mg/dL      eGFR 100 ml/min/1.73sq m     Narrative:      National Kid[employer] Disease Foundation guidelines for Chronic Kidney Disease (CKD):     Stage 1 with normal or high GFR (GFR > 90 mL/min/1.73 square meters)    Stage 2 Mild CKD (GFR = 60-89 mL/min/1.73 square meters)    Stage 3A Moderate CKD (GFR = 45-59 mL/min/1.73 square meters)    Stage 3B Moderate CKD (GFR = 30-44 mL/min/1.73 square meters)    Stage 4 Severe CKD (GFR = 15-29 mL/min/1.73 square meters)    Stage 5 End Stage CKD (GFR <15 mL/min/1.73 square meters)  Note: GFR calculation is accurate only with a steady state creatinine    HS Troponin 0hr (reflex protocol) [[id number]]  (Normal) Collected: 04/01/24 1112    Lab Status: Final result Specimen: Blood from Arm, Right Updated:  04/01/24 1158     hs TnI 0hr 4 ng/L     CBC and differential [[id number]] Collected: 04/01/24 1112    Lab Status: Final result Specimen: Blood from Arm, Right Updated: 04/01/24 1124     WBC 7.78 Thousand/uL      RBC 4.97 Million/uL      Hemoglobin 15.8 g/dL      Hematocrit 44.7 %      MCV 90 fL      MCH 31.8 pg      MCHC 35.3 g/dL      RDW 12.1 %      MPV 9.8 fL      Platelets 172 Thousands/uL      nRBC 0 /100 WBCs      Neutrophils Relative 69 %      Immature Grans % 1 %      Lymphocytes Relative 18 %      Monocytes Relative 9 %      Eosinophils Relative 2 %      Basophils Relative 1 %      Neutrophils Absolute 5.43 Thousands/µL      Absolute Immature Grans 0.06 Thousand/uL      Absolute Lymphocytes 1.38 Thousands/µL      Absolute Monocytes 0.69 Thousand/µL      Eosinophils Absolute 0.15 Thousand/µL      Basophils Absolute 0.07 Thousands/µL     Fingerstick Glucose (POCT) [[id number]]  (Abnormal) Collected: 04/01/24 1055    Lab Status: Final result Specimen: Blood Updated: 04/01/24 1057     POC Glucose 147 mg/dl                    CTA head and neck with and without contrast   Final Result by E. Alec Schoenberger, MD (04/01 1349)      No acute intracranial pathology.   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   Moderate to severe stenosis in the intracranial internal carotid arteries. Stenosis right PCA.   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   Findings are similar to outside CTA report.      The study was marked in EPIC for immediate notification.         Workstation performed: [id number]                    Procedures  ECG 12 Lead Documentation Only    Date/Time: 4/1/2024 12:03 PM    Performed by:  Suha Torres PA-C  Authorized by: Suha Torres PA-C    Indications / Diagnosis:  TIA sx  ECG reviewed by me, the ED Provider: yes    Patient location:  ED  Rate:     ECG rate:  80    ECG rate assessment: normal    Rhythm:     Rhythm: sinus rhythm    Ectopy:     Ectopy: none    QRS:     QRS axis:  Normal  Conduction:     Conduction: normal    ST segments:     ST segments:  Normal  T waves:     T waves: normal             ED Course  ED Course as of 04/1955 Mon Apr 01, 2024 1555 Discussed with Dr. Cook (neurology) and Dr. Hodges (internal medicine). No indication for admission as patient is back to baseline, CTA is unchanged, and he is medically optimized on dual antiplatelet therapy. Neurology recommending outpatient f/u with vascular surgeon.                  Stroke Assessment       Row Name 04/01/24 1954             NIH Stroke Scale    Interval Baseline      Level of Consciousness (1a.) 0      LOC Questions (1b.) 0      LOC Commands (1c.) 0      Best Gaze (2.) 0      Visual (3.) 0      Facial Palsy (4.) 0      Motor Arm, Left (5a.) 0      Motor Arm, Right (5b.) 0      Motor Leg, Left (6a.) 0      Motor Leg, Right (6b.) 0      Limb Ataxia (7.) 0      Sensory (8.) 0      Best Language (9.) 0      Dysarthria (10.) 0      Extinction and Inattention (11.) (Formerly Neglect) 0      Total 0                    Flowsheet Row Most Recent Value   Thrombolytic Decision Options    Thrombolytic Decision Patient not a candidate.   Patient is not a candidate options Symptoms resolved/clearly non disabling.                      SBIRT 22yo+      Flowsheet Row Most Recent Value   Initial Alcohol Screen: US AUDIT-C     1. How often do you have a drink containing alcohol? 0 Filed at: 04/01/2024 1136   2. How many drinks containing alcohol do you have on a typical day you are drinking?  0 Filed at: 04/01/2024 1136   3b. FEMALE Any Age, or MALE 65+: How often do you have 4 or more drinks on one occassion? 0  Filed at: 04/01/2024 1136   Audit-C Score 0 Filed at: 04/01/2024 1136   IGNACIO: How many times in the past year have you...    Used an illegal drug or used a prescription medication for non-medical reasons? Never Filed at: 04/01/2024 1136                      Medical Decision Making  69yoM presenting after an episode of L facial droop, imbalance, and bilateral tunnel vision 2 days ago. Resolved after 4 hours. Currently asymptomatic. Hx of prior stroke with residual R sided numbness. He is well appearing with stable vitals. No deficits noted on exam other than decreased sensation of RUE/RLE which is chronic. NIHSS 0.    Initial ED plan: Check cardiac labs, EKG, and CTA head/neck.    Final assessment: Labs unremarkable and no ischemic changes on EKG. CTA head/neck shows multiple areas of stenosis which are unchanged from prior CTA report. Case discussed with both internal medicine and neurology who both feel that there is no indication for admission as patient's symptoms have resolved, imaging findings are stable, and he is already medically optimized on dual antiplatelet therapy. Patient in agreement with this. Recommend f/u with PCP and vascular surgery. Strict ED return precautions discussed. Patient expressed understanding and is agreeable to plan. Patient discharged in stable condition.        Problems Addressed:  TIA (transient ischemic attack): acute illness or injury    Amount and/or Complexity of Data Reviewed  Labs: ordered.  Radiology: ordered.  ECG/medicine tests: ordered and independent interpretation performed.    Risk  Prescription drug management.             Disposition  Final diagnoses:   TIA (transient ischemic attack)     Time reflects when diagnosis was documented in both MDM as applicable and the Disposition within this note       Time User Action Codes Description Comment    4/1/2024  3:13 PM Suha Torres Add [G45.9] TIA (transient ischemic attack)           ED Disposition       ED  Disposition   Discharge    Condition   Stable    Date/Time   Mon Apr 1, 2024 1556    Comment   Santino Cooney discharge to home/self care.                   Follow-up Information       Follow up With Specialties Details Why Contact Info Additional Information    Percy Lee MD Internal Medicine Schedule an appointment as soon as possible for a visit   [address] [place] [address]  [phone]       [facility] Emergency Medicine  If symptoms worsen [address] [facility] [id number] [phone] [facility], [address], [place], [address]            Discharge Medication List as of 4/1/2024  3:58 PM        CONTINUE these medications which have NOT CHANGED    Details   acetaminophen (TYLENOL) 325 mg tablet Tylenol 325 mg tablet, Historical Med      albuterol (PROVENTIL HFA,VENTOLIN HFA) 90 mcg/act inhaler Starting Wed 1/27/2021, Historical Med      BD Pen Needle Keke U/F 32G X 4 MM MISC Starting Mon 1/25/2021, Historical Med      canagliflozin (INVOKANA) 100 mg Invokana 100 mg tablet, Historical Med      cyclobenzaprine (FLEXERIL) 10 mg tablet Take 10 mg by mouth Three times daily as needed, Starting Tue 3/23/2021, Until Tue 10/17/2023 at 2359, Historical Med      desonide (DESOWEN) 0.05 % cream Apply topically 2 (two) times a day, Starting Wed 2/12/2014, Historical Med      fluticasone (FLONASE) 50 mcg/act nasal spray 1 spray into each nostril 2 (two) times a day, Starting Mon 3/27/2023, Normal      HYDROcodone-acetaminophen (NORCO) 7.5-325 mg per tablet hydrocodone 7.5 mg-acetaminophen 325 mg tablet, Historical Med      ibuprofen (MOTRIN) 800 mg tablet Take by mouth, Historical Med      insulin glargine (LANTUS SOLOSTAR) 100 units/mL injection pen Inject under the skin daily, Historical Med      insulin lispro (HumaLOG) 100 units/mL injection Inject under the skin 3 (three) times a  day before meals, Historical Med      !! levocetirizine (XYZAL) 5 MG tablet daily  , Historical Med      !! levocetirizine (XYZAL) 5 MG tablet TAKE ONE TABLET BY MOUTH EVERY DAY, Normal      !! levocetirizine (XYZAL) 5 MG tablet TAKE ONE TABLET BY MOUTH EVERY DAY, Normal      losartan (COZAAR) 25 mg tablet Take 12.5 mg by mouth daily, Starting Mon 11/12/2018, Until Tue 10/17/2023, Historical Med      multivitamin (THERAGRAN) TABS Take by mouth, Historical Med      OXYMETAZOLINE HCL, OPHTH, 0.025 % SOLN 2 sprays into each nostril, Starting Mon 4/2/2018, Historical Med      valACYclovir (VALTREX) 500 mg tablet Take 1 tablet (500 mg total) by mouth daily, Starting Tue 10/17/2023, Until Fri 10/11/2024, Normal       !! - Potential duplicate medications found. Please discuss with provider.          No discharge procedures on file.    PDMP Review       None            ED Provider  Electronically Signed by             Suha Torres PA-C  04/01/24 2002

## 2024-04-17 NOTE — TELEPHONE ENCOUNTER
Patient called for a refill of Valtrex, but then noticed on the bottle there was still refills.  Patient is going to contact the pharmacy to refill.

## 2024-06-22 ENCOUNTER — OFFICE VISIT (OUTPATIENT)
Dept: OBGYN CLINIC | Facility: CLINIC | Age: 69
End: 2024-06-22
Payer: MEDICARE

## 2024-06-22 VITALS
DIASTOLIC BLOOD PRESSURE: 64 MMHG | WEIGHT: 223 LBS | HEIGHT: 73 IN | SYSTOLIC BLOOD PRESSURE: 133 MMHG | BODY MASS INDEX: 29.55 KG/M2 | HEART RATE: 82 BPM

## 2024-06-22 DIAGNOSIS — M25.561 RIGHT KNEE PAIN, UNSPECIFIED CHRONICITY: Primary | ICD-10-CM
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
## 2024-06-22 PROCEDURE — 99203 OFFICE O/P NEW LOW 30 MIN: CPT | Performed by: FAMILY MEDICINE

## 2024-06-22 NOTE — PROGRESS NOTES
Assessment:     1. Right knee pain, unspecified chronicity  XR knee 4+ vw right injury      2. Fall, initial encounter          Orders Placed This Encounter   Procedures    XR knee 4+ vw right injury        Impression:   Right knee pain likely secondary to bone contusion.      Pertinent past medical history  Multiple allergies (corticosteroid)  Atherosclerosis of bilateral lower extremities with intermittent claudication.  Peripheral artery disease  Diabetes, A1c 6.5 (04/2024)  History of lumbar radiculopathy.    Conservative Management   We discussed different treatment options:    Ice or Heat Therapy as needed 1-2 times daily for 10-20 minutes. As tolerated.   Over the counter Tylenol and/or NSAIDs  as needed based off your Past Medical Hx. Please follow product label for dosing and maximum limits.    Trial of over the counter Topical Analgesics such as Lidocaine cream or Voltaren Gel, as tolerated. If skin becomes irritated, discontinue use.   Formal Handout provided on General Information of PRICE, knee exercises  Please range joint through gentle range of motion as tolerated.   Initiate Home Exercise Program for Stretching and Strengthening affected area.          Imaging   Reviewed prior xrays obtain in Urgent or Emergency Department. These were reviewed in office with patient today.   [facility] arterial Doppler lower extremities bilateral findings:     The RIGHT brachial artery systolic pressure is 146 mmHg, and the LEFT   brachial artery systolic pressure is 149 mmHg.      The RIGHT distal thigh is 170 and the calf is 86.  The right PT at the   ankle is 77 and the DP is 84 mmHg respectively.    The toe systolic pressure (mmHg) is 42.   The resting ankle/brachial index (LEON) is 0.56.      The LEFT distal thigh is 111 and the calf is 105. The left PT at the ankle   is  98 and the DP is  77 mmHg respectively.    The toe systolic pressure (mmHg) is 39.   The resting ankle/brachial index (LEON) is 0.66.      The  Doppler and pulse volume recording (PVR) show blunted waveforms   bilaterally.   All imaging from today was reviewed by myself and explained to the patient.   06/22/2024 right knee x-ray: No acute osseous abnormality   Kellgren-Jarod Classification     Present Grade Radiological findings   [] 0 No radiological findings   [x] 1 Doubtful narrowing of joint space and possible osteophytic lipping   [x] 2 Definite osteophytes and possible narrowing of joint space   [] 3 Moderate multiple osteophytes, definitive narrowing of joint space, small pseudocystic areas with sclerotic walls and possibly deformity of bone contour   [] 4 Large osteophytes, marked narrowing of joint space, severe sclerosis and definitive deformity of bone contour   **If more than 1 [x] is present patient is between grades       Procedure  Not appropriate at this time.   Unable to complete corticosteroid injection due to history of allergy with corticosteroid    Shared decision making, patient agreeable to plan.      Return for Follow up as needed or if symptoms do NOT improve.    HPI:   Santino Cooney is a 69 y.o. male  who presents for evaluation of   Chief Complaint   Patient presents with    Right Knee - Pain       Occupation: retired    Onset/Mechanism: Patient was stepping out of a camper 2 weeks ago when he landed straight onto his right knee..  Location: Medial joint line.  Severity: Current severity: 1-10/10.  Pain described as: Ache  Radiation: denies.  Provocative: Walking upstairs, prolonged sitting.  Associated symptoms: Denies swelling.      Denies any hx of fracture of affected limb.   Denies any surgical history of affected limb.      Summary of treatment to-date:   CSI over 10 years ago.      Following History Reviewed and Updated     Past Medical History:   Diagnosis Date    Stroke (HCC)      History reviewed. No pertinent surgical history.  History reviewed. No pertinent family history.    Social History     Substance and Sexual  Activity   Alcohol Use Never     Social History     Substance and Sexual Activity   Drug Use Never     Social History     Tobacco Use   Smoking Status Never   Smokeless Tobacco Never       Social Determinants of Health     Tobacco Use: Low Risk  (6/22/2024)    Patient History     Smoking Tobacco Use: Never     Smokeless Tobacco Use: Never     Passive Exposure: Not on file   Recent Concern: Tobacco Use - Medium Risk (5/16/2024)    Received from [facility]    Patient History     Smoking Tobacco Use: Former     Smokeless Tobacco Use: Never     Passive Exposure: Not on file   Alcohol Use: Patient Declined (8/1/2023)    Received from [facility], [facility]    AUDIT-C     Frequency of Alcohol Consumption: Patient declined     Average Number of Drinks: Patient declined     Frequency of Binge Drinking: Patient declined   Financial Resource Strain: Patient Declined (12/11/2023)    Received from [facility], [facility]    Overall Financial Resource Strain (CARDIA)     Difficulty of Paying Living Expenses: Patient declined   Food Insecurity: Patient Declined (12/11/2023)    Received from [facility], [facility]    Hunger Vital Sign     Worried About Running Out of Food in the Last Year: Patient declined     Ran Out of Food in the Last Year: Patient declined   Transportation Needs: Patient Declined (12/11/2023)    Received from [facility], [facility]    PRAPARE - Transportation     Lack of Transportation (Medical): Patient declined     Lack of Transportation (Non-Medical): Patient declined   Physical Activity: Not on file   Stress: Patient Declined (8/1/2023)    Received from [facility], [facility]    [employer] [place] [employer] - Occupational Stress Questionnaire     Feeling of Stress : Patient declined   Social  Connections: Unknown (8/1/2023)    [facility]    Social Connection and Isolation Panel [NHANES]     Frequency of Communication with Friends and Family: More than three times a week     Frequency of Social Gatherings with Friends and Family: Patient declined     Attends Church Services: Patient declined     Active Member of Clubs or Organizations: Yes     Attends Club or Organization Meetings: Patient declined     Marital Status: Patient declined   Intimate Partner Violence: Patient Declined (12/11/2023)    [facility]    Humiliation, Afraid, Rape, and Kick questionnaire     Fear of Current or Ex-Partner: Patient declined     Emotionally Abused: Patient declined     Physically Abused: Patient declined     Sexually Abused: Patient declined   Depression: Not at risk (8/8/2023)    [facility] from [facility], [facility]    PHQ-2     PHQ-2 Score: 0   Housing Stability: Unknown (12/11/2023)    [facility] from [facility], [facility]    Housing Stability Vital Sign     Unable to Pay for Housing in the Last Year: Patient refused     Number of Places Lived in the Last Year: 1     Unstable Housing in the Last Year: Patient refused   Utilities: Not on file   Health Literacy: Not on file        Allergies   Allergen Reactions    Chocolate Hazelnut Flavor - Food Allergy Anaphylaxis    Nuts - Food Allergy Itching and Rash    Peanut Oil - Food Allergy Anaphylaxis    Aspirin Other (See Comments)     Other reaction(s): Unknown    Ciprofloxacin      Other reaction(s): (PIMS)    Corticosteroids     Dulaglutide Other (See Comments)     Upper abdominal pain and vomiting    Ezetimibe Other (See Comments)     Multiple side effects    Fenofibrate      Other reaction(s): (PIMS) severe myalgias    Metformin      Other reaction(s): (PIMS) GI distress     "Penicillin G      Other reaction(s): (PIMS)    Penicillins     Statins        Review of Systems      Review of Systems     Review of Systems   Constitutional: Negative for chills and fever.   HENT: Negative for drooling and sneezing.    Eyes: Negative for redness.   Respiratory: Negative for cough and wheezing.    Gastrointestinal: Negative for vomiting.   Psychiatric/Behavioral: Negative for behavioral problems. The patient is not nervous/anxious.      All other systems negative.   Physical Exam   Physical Exam    Vitals and nursing note reviewed.  Constitutional:   Appearance. Normal Appearance.  /64   Pulse 82   Ht 6' 1\" (1.854 m)   Wt 101 kg (223 lb)   BMI 29.42 kg/m²     Body mass index is 29.42 kg/m².   HENT:  Head: Atraumatic.  Nose: Nose normal  Eyes: Conjunctiva/sclera: Conjunctivae normal.  Cardiovascular:   Rate and Rhythm: Bilateral equal distal pulses  Pulmonary:   Effort: Pulmonary effort is normal  Skin:   General: Skin is warm and dry.  Neurological:   General: No focal deficit present.  Mental Status: Alert and oriented to person, place, and time.   Psychiatric:   Mood and Affect: mood normal.  Behavior: Behavior normal     Musculoskeletal Exam     Ortho Exam     Right Knee:     Inspection:  Erythema Bruising Effusion Muscle atrophy Retracted muscle   Negative neg neg Negative Negative     Palpation:  Minimal discomfort at medial femoral condyle  Increased warmth Crepitus Palpable muscle depression   Negative neg Negative     Tenderness:  Quadriceps tendon Patella Patellar tendon  Joint line   Neg. Neg. Neg. Neg.        Tibial tubercle Yamilet's tubercle Pes anserine bursa Posterior knee   Neg. Neg. Neg. Neg.       Biceps femoris Semimembranosus/semitendinosis Popliteus tendon Fibular head   Neg. Neg. Neg. Neg.       Bilateral Range of Motion:  Active flexion Passive flexion   (normal 135 degrees) (normal 135 degrees)   Intact      Active extension Passive extension   (normal 0 degrees) " "(normal 0 degrees)   Intact        Bilateral strength:  Seated hip flexion Seated knee flexion Seated knee extension   5/5 5/5 5/5     Seated great toe extension Seated ankle dorsiflexion Seated ankle plantarflexion   5/5 5/5 5/5     Seated hip adduction Seated hip abduction Prone knee flexion              Patella:  Patellofemoral tracking  Patellar Grind Kat's Hoffa's test  Medial patellar plica test    observing the patella for smooth motion while the patient contracts the quadriceps muscle  applying pressure to the fat pad with pain felt in the last 10 degrees of extension 30 degrees of knee flexion with pressure on the lateral border of the patella directed medially   normal and symmetrical            Ligament testing:  Anterior cruciate ligament (ACL)  Posterior cruciate ligament (PCL) Medial Collateral Ligament (MCL)  Lateral Collateral Ligament (LCL):   Lachman's Posterior drawer's Valgus Varus   Negative for laxity Negative for laxity Negative for laxity Negative for laxity     Meniscal testing:  Medial Prema Lateral Prema Apley's Thessaly's Bounce home test   Negative Negative N/A N/A N/A     Special tests:  Ely's test: Alok test Igor's test      Rectus femoris: Prone position with passive knee flexion iliopsoas: supine position with passive hip flexion  seated position,active internal rotation of the tibia, knee extended    Contralateral leg remains on the table without contracture        Special testing  Able to ambulate from seated position up onto supine position on the table    Neurovascular:  Sensation to light touch Posterior tibial artery   Intact and equal bilaterally Intact and equal bilaterally     (Test not completed if space left blank )           Procedures       Portions of the record may have been created with voice recognition software. Occasional wrong word or \"sound alike\" substitutions may have occurred due to the inherent limitations of voice recognition software. Please " review the chart carefully and recognize, using context, where substitutions/typographical errors may have occurred.

## 2024-06-22 NOTE — PATIENT INSTRUCTIONS
P.R.I.C.E. Treatment   WHAT YOU NEED TO KNOW:   What is P.R.I.C.E. treatment?  P.R.I.C.E. treatment is a 5-step process used to decrease swelling and pain caused by an injury. P.R.I.C.E. stands for protect, rest, ice, compress, and elevate. Start P.R.I.C.E. within 24 to 48 hours of an injury.  How do I use P.R.I.C.E. treatment?       Protect  your injury from more damage. Support the injured area with a brace or splint. Your healthcare provider will tell you how long to use the brace or splint.    Rest  your injured area as directed. You may need to stop using, or keep weight off, the injury for 48 hours or longer. Your healthcare provider may recommend crutches or another device. Return to your usual activities as directed.    Apply ice  on your injured area for 15 to 20 minutes every 4 hours or as directed. Use an ice pack, or put crushed ice in a plastic bag. Cover the bag with a towel before you apply it to your skin. Ice helps prevent tissue damage and decreases swelling and pain.    Compress  (keep pressure on) the injured area. Compression will help decrease swelling and support the injured area. Use an elastic bandage, air stirrup, splint, or sling as directed. If you use an elastic bandage, make sure the bandage is not too tight. You should be able to slip 2 fingers between the bandage and your skin.    Elevate  the injured area above the level of your heart as often as you can. This will help decrease swelling and pain. Prop the injured area on pillows or blankets to keep it elevated comfortably.  When should I seek immediate care?   Your pain is severe.    You have severe swelling or deformity.    You have numbness in the injured area.    When should I call my doctor?   Your pain and swelling do not go away after a few days.    You have questions or concerns about your condition or care.    CARE AGREEMENT:   You have the right to help plan your care. Learn about your health condition and how it may be  treated. Discuss treatment options with your healthcare providers to decide what care you want to receive. You always have the right to refuse treatment. The above information is an  only. It is not intended as medical advice for individual conditions or treatments. Talk to your doctor, nurse or pharmacist before following any medical regimen to see if it is safe and effective for you.  © Copyright [employer] 2023 Information is for End User's use only and may not be sold, redistributed or otherwise used for commercial purposes.  Knee Exercises   WHAT YOU NEED TO KNOW:   What do I need to know about knee exercises?  Knee exercises help strengthen the muscles around your knee. Strong muscles can help reduce pain and decrease your risk of future injury. Knee exercises also help you heal after an injury or surgery. These are beginning exercises. Ask your healthcare provider if you need to see a physical therapist for more advanced exercises.  What are some general guidelines for knee exercises?   Start slowly.  As you get stronger, you may be able to do more sets of each exercise or add weights.    Stop if you feel pain.  It is normal to feel some discomfort at first, but you should not feel pain. Tell your provider or physical therapist if you have pain while you exercise. Regular exercise will help decrease your discomfort over time.    Do the exercises on both legs.  Do this so both knees remain strong.    Warm up before you do knee exercises.  Walk or ride a stationary bike for 5 or 10 minutes to warm your muscles.    How do I perform knee stretches safely?  Always stretch before you do strengthening exercises. Do these stretching exercises again after you do the strengthening exercises. Do these stretches 4 or 5 days a week, or as directed.  Standing calf stretch:  Face a wall and place both palms flat on the wall, or hold the back of a chair for balance. Keep a slight bend in your knees. Take a big  step backward with one leg. Keep your other leg directly under you. Keep both heels flat and press your hips forward. Hold the stretch for 30 seconds, and then relax for 30 seconds. Switch legs. Repeat 2 or 3 times on each leg.         Standing quadriceps stretch:  Stand and place one hand against a wall or hold the back of a chair for balance. With your weight on one leg, bend your other leg and grab your ankle. Bring your heel toward your buttocks. Hold the stretch for 30 to 60 seconds. Switch legs. Repeat 2 or 3 times on each leg.         Sitting hamstring stretch:  Sit with both legs straight in front of you. Do not point or flex your toes. Place your palms on the floor and slide your hands forward until you feel the stretch. Do not round your back. Hold the stretch for 30 seconds. Repeat 2 or 3 times.       How do I perform knee strengthening exercises safely?  Do these exercises 4 or 5 days a week, or as directed.  Standing half squats:  Stand with your feet shoulder-width apart. Lean your back against a wall or hold the back of a chair for balance, if needed. Slowly sit down about 10 inches, as if you are going to sit in a chair. Your body weight should be mostly over your heels. Hold the squat for 5 seconds, then rise to a standing position. Do 3 sets of 10 squats to strengthen your buttocks and thighs.         Standing hamstring curls:  Face a wall and place both palms flat on the wall, or hold the back of a chair for balance. With your weight on one leg, lift your other foot as close to your buttocks as you can. Hold for 5 seconds and then lower your leg. Do 2 sets of 10 curls on each leg. This exercise strengthens the muscles in the back of your thigh.         Standing calf raises:  Face a wall and place both palms flat on the wall, or hold the back of a chair for balance. Stand up straight, and do not lean. Place all your weight on one leg by lifting the other foot off the floor. Raise the heel of the  foot that is on the floor as high as you can and then lower it. Do 2 sets of 10 calf raises on each leg to strengthen your calf muscles.         Straight leg lifts:  Lie on your stomach with straight legs. Fold your arms in front of you and rest your head in your arms. Tighten your leg muscles and raise one leg as high as you can. Hold for 5 seconds, then lower your leg. Do 2 sets of 10 lifts on each leg to strengthen your buttocks.         Sitting leg lifts:  Sit in a chair. Slowly straighten and raise one leg. Squeeze your thigh muscles and hold for 5 seconds. Relax and return your foot to the floor. Do 2 sets of 10 lifts on each leg. This helps strengthen the muscles in the front of your thigh.       When should I call my doctor or physical therapist?   You have new pain or your pain becomes worse.    You have questions or concerns about your condition or care.    CARE AGREEMENT:   You have the right to help plan your care. Learn about your health condition and how it may be treated. Discuss treatment options with your healthcare providers to decide what care you want to receive. You always have the right to refuse treatment. The above information is an  only. It is not intended as medical advice for individual conditions or treatments. Talk to your doctor, nurse or pharmacist before following any medical regimen to see if it is safe and effective for you.  © Copyright [employer] 2023 Information is for End User's use only and may not be sold, redistributed or otherwise used for commercial purposes.

## 2024-09-06 ENCOUNTER — CONSULT (OUTPATIENT)
Age: 69
End: 2024-09-06
Payer: MEDICARE

## 2024-09-06 VITALS
BODY MASS INDEX: 29.82 KG/M2 | DIASTOLIC BLOOD PRESSURE: 70 MMHG | HEIGHT: 73 IN | OXYGEN SATURATION: 97 % | HEART RATE: 69 BPM | SYSTOLIC BLOOD PRESSURE: 148 MMHG | WEIGHT: 225 LBS

## 2024-09-06 DIAGNOSIS — R19.4 CHANGE IN BOWEL HABITS: Primary | ICD-10-CM

## 2024-09-06 PROCEDURE — 99204 OFFICE O/P NEW MOD 45 MIN: CPT | Performed by: INTERNAL MEDICINE

## 2024-09-06 RX ORDER — UMECLIDINIUM BROMIDE AND VILANTEROL TRIFENATATE 62.5; 25 UG/1; UG/1
1 POWDER RESPIRATORY (INHALATION) DAILY
COMMUNITY
Start: 2024-05-16

## 2024-09-06 RX ORDER — CILOSTAZOL 100 MG/1
100 TABLET ORAL 2 TIMES DAILY
COMMUNITY
Start: 2024-06-20

## 2024-10-17 ENCOUNTER — OFFICE VISIT (OUTPATIENT)
Age: 69
End: 2024-10-17
Payer: MEDICARE

## 2024-10-17 VITALS
DIASTOLIC BLOOD PRESSURE: 72 MMHG | BODY MASS INDEX: 29.03 KG/M2 | HEIGHT: 73 IN | OXYGEN SATURATION: 96 % | WEIGHT: 219 LBS | SYSTOLIC BLOOD PRESSURE: 110 MMHG | TEMPERATURE: 98.6 F | RESPIRATION RATE: 18 BRPM | HEART RATE: 68 BPM

## 2024-10-17 DIAGNOSIS — D22.9 MULTIPLE NEVI: Primary | ICD-10-CM

## 2024-10-17 DIAGNOSIS — D18.01 CHERRY ANGIOMA: ICD-10-CM

## 2024-10-17 DIAGNOSIS — L82.1 SEBORRHEIC KERATOSIS: ICD-10-CM

## 2024-10-17 DIAGNOSIS — B00.9 HERPES SIMPLEX INFECTION OF SKIN: ICD-10-CM

## 2024-10-17 DIAGNOSIS — L40.9 PSORIASIS: ICD-10-CM

## 2024-10-17 PROCEDURE — 99213 OFFICE O/P EST LOW 20 MIN: CPT | Performed by: DERMATOLOGY

## 2024-10-17 RX ORDER — OMEGA-3-ACID ETHYL ESTERS 1 G/1
2 CAPSULE, LIQUID FILLED ORAL 2 TIMES DAILY
COMMUNITY
Start: 2024-10-17 | End: 2025-10-17

## 2024-10-17 NOTE — PROGRESS NOTES
"[facility] Note     Patient Name: Santino Cooney  Encounter Date: 10/17/2024     Have you been cared for by a [facility] Dermatologist in the last 3 years and, if so, which description applies to you?    Yes.  I have been here within the last 3 years, and my medical history has NOT changed since that time.  I am MALE/not capable of bearing children.    REVIEW OF SYSTEMS:  Have you recently had or currently have any of the following? No changes in my recent health.   PAST MEDICAL HISTORY:  Have you personally ever had or currently have any of the following?  If \"YES,\" then please provide more detail. No changes in my medical history.   HISTORY OF IMMUNOSUPPRESSION: Do you have a history of any of the following:  Systemic Immunosuppression such as Diabetes, Biologic or Immunotherapy, Chemotherapy, Organ Transplantation, Bone Marrow Transplantation or Prednisone?  No     Answering \"YES\" requires the addition of the dotphrase \"IMMUNOSUPPRESSED\" as the first diagnosis of the patient's visit.   FAMILY HISTORY:  Any \"first degree relatives\" (parent, brother, sister, or child) with the following?    No changes in my family's known health.   PATIENT EXPERIENCE:    Do you want the Dermatologist to perform a COMPLETE skin exam today including a clinical examination under the \"bra and underwear\" areas?  Yes  If necessary, do we have your permission to call and leave a detailed message on your Preferred Phone number that includes your specific medical information?  Yes      Allergies   Allergen Reactions    Chocolate Hazelnut Flavor - Food Allergy Anaphylaxis    Nuts - Food Allergy Itching and Rash    Peanut Oil - Food Allergy Anaphylaxis    Aspirin Other (See Comments)     Other reaction(s): Unknown    Ciprofloxacin      Other reaction(s): (PIMS)    Corticosteroids     Dulaglutide Other (See Comments)     Upper abdominal pain and vomiting    Ezetimibe Other (See Comments)     Multiple side effects    " Fenofibrate      Other reaction(s): (PIMS) severe myalgias    Hydrocodone-Acetaminophen Other (See Comments)    Metformin      Other reaction(s): (PIMS) GI distress    Niacin Myalgia    Penicillin G      Other reaction(s): (PIMS)    Penicillins     Statins       Current Outpatient Medications:     acetaminophen (TYLENOL) 325 mg tablet, Tylenol 325 mg tablet, Disp: , Rfl:     aspirin 81 mg chewable tablet, Chew 81 mg daily, Disp: , Rfl:     BD Pen Needle Keke U/F 32G X 4 MM MISC, , Disp: , Rfl:     cilostazol (PLETAL) 100 mg tablet, Take 100 mg by mouth 2 (two) times a day, Disp: , Rfl:     clopidogrel (PLAVIX) 75 mg tablet, Take 75 mg by mouth daily, Disp: , Rfl:     fluticasone (FLONASE) 50 mcg/act nasal spray, 1 spray into each nostril 2 (two) times a day, Disp: 16 g, Rfl: 3    ibuprofen (MOTRIN) 800 mg tablet, Take by mouth, Disp: , Rfl:     insulin glargine (LANTUS SOLOSTAR) 100 units/mL injection pen, Inject under the skin daily, Disp: , Rfl:     insulin lispro (HumaLOG) 100 units/mL injection, Inject under the skin 3 (three) times a day before meals, Disp: , Rfl:     levalbuterol (XOPENEX HFA) 45 mcg/act inhaler, Inhale 1 puff every 6 (six) hours as needed for wheezing, Disp: 15 g, Rfl: 3    LORazepam (ATIVAN) 0.5 mg tablet, Take by mouth every 8 (eight) hours as needed for anxiety, Disp: , Rfl:     multivitamin (THERAGRAN) TABS, Take by mouth, Disp: , Rfl:     niacin 100 mg tablet, Take 100 mg by mouth daily with breakfast, Disp: , Rfl:     omega-3-acid ethyl esters (Lovaza) 1 g capsule, Take 2 g by mouth 2 (two) times a day, Disp: , Rfl:     sertraline (ZOLOFT) 100 mg tablet, Take 100 mg by mouth daily, Disp: , Rfl:     umeclidinium-vilanterol (Anoro Ellipta) 62.5-25 mcg/actuation inhaler, Inhale 1 puff daily, Disp: , Rfl:     canagliflozin (INVOKANA) 100 mg, Invokana 100 mg tablet (Patient not taking: Reported on 4/17/2024), Disp: , Rfl:     cyclobenzaprine (FLEXERIL) 10 mg tablet, Take 10 mg by mouth Three  "times daily as needed, Disp: , Rfl:     HYDROcodone-acetaminophen (NORCO) 7.5-325 mg per tablet, hydrocodone 7.5 mg-acetaminophen 325 mg tablet (Patient not taking: Reported on 4/17/2024), Disp: , Rfl:     levocetirizine (XYZAL) 5 MG tablet, Take 1 tablet (5 mg total) by mouth daily, Disp: 90 tablet, Rfl: 1    losartan (COZAAR) 25 mg tablet, Take 12.5 mg by mouth daily, Disp: , Rfl:     OXYMETAZOLINE HCL, OPHTH, 0.025 % SOLN, 2 sprays into each nostril (Patient not taking: Reported on 4/17/2024), Disp: , Rfl:     valACYclovir (VALTREX) 500 mg tablet, Take 1 tablet (500 mg total) by mouth daily, Disp: 90 tablet, Rfl: 3          Whom besides the patient is providing clinical information about today's encounter?   NO ADDITIONAL HISTORIAN (patient alone provided history)    Physical Exam and Assessment/Plan by Diagnosis:        MELANOCYTIC NEVI (\"Moles\")    Physical Exam:  Anatomic Location Affected: Mostly on sun-exposed areas of the body   Morphological Description:  Scattered, 1-4mm round to ovoid, symmetrical-appearing, even bordered, skin colored to dark brown macules/papules, mostly in sun-exposed areas  Pertinent Positives:  Pertinent Negatives:    Additional History of Present Condition:  present on exam     Assessment and Plan:  Based on a thorough discussion of this condition and the management approach to it (including a comprehensive discussion of the known risks, side effects and potential benefits of treatment), the patient (family) agrees to implement the following specific plan:  Provided handout with information regarding the ABCDE's of moles   Recommend routine skin exams every year   Sun avoidance, protective clothing (known as UPF clothing), and the use of at least SPF 30 sunscreens is advised. Sunscreen should be reapplied every two hours when outside.       SEBORRHEIC KERATOSIS; NON-INFLAMED    Physical Exam:  Anatomic Location Affected:  scattered across trunk, extremities  Morphological " "Description:  Flat and raised, waxy, smooth to warty textured, yellow to brownish-grey to dark brown to blackish, discrete, \"stuck-on\" appearing papules.  Pertinent Positives:  Pertinent Negatives:    Additional History of Present Condition:  Patient reports new bumps on the skin.  Denies itch, burn, pain, bleeding or ulceration.  Present constantly; nothing seems to make it worse or better.  No prior treatment.      Assessment and Plan:  Based on a thorough discussion of this condition and the management approach to it (including a comprehensive discussion of the known risks, side effects and potential benefits of treatment), the patient (family) agrees to implement the following specific plan:  Reassured benign        ANGIOMA (\"CHERRY ANGIOMA\")    Physical Exam:  Anatomic Location: scattered across sun exposed areas of the trunk and extremities   Morphologic Description: Firm red to reddish-blue discrete papules  Pertinent Positives:  Pertinent Negatives:    Additional History of Present Condition:  Present on exam.    Assessment and Plan:  Reassured benign      PSORIASIS    Physical Exam:  Anatomic Location Affected:  bilateral elbows, bilateral knees, postauricular neck   Morphological Description:  scaling erythematous oatches  Severity: mild  Body Percent Affected: less then 1%   Pertinent Positives:  Pertinent Negatives:    Additional History of Present Condition:  patient is not currently using any topicals.    Assessment and Plan:  Based on a thorough discussion of this condition and the management approach to it (including a comprehensive discussion of the known risks, side effects and potential benefits of treatment), the patient (family) agrees to implement the following specific plan:  Follow up as needed patient does not request treatment     Psoriasis is a chronic inflammatory condition that causes the body to make new skin cells in days rather than weeks. As these cells pile up on the surface of the " skin, you may see thick, scaly patches of thickened skin.   Psoriasis affects 2-4% of males and females. It can start at any age including childhood, with peaks of onset at 15-25 years and 50-60 years. It tends to persist lifelong, fluctuating in extent and severity. It is particularly common in Caucasians but may affect people of any race. About one-third of patients with psoriasis have family members with psoriasis.  Psoriasis is multifactorial. It is classified as an immune-mediated inflammatory disease (IMID). Genetic factors are important and influence the type of psoriasis and response to treatment.     What are the signs and symptoms of psoriasis?    There are many different types of psoriasis that each have present uniquely. The types of psoriasis include:    Plaque psoriasis: About 80% to 90% of people who have psoriasis develop this type. When plaque psoriasis appears, you may see:  Plaque psoriasis usually presents with symmetrically distributed, red, scaly plaques with well-defined edges. The scale is typically silvery white, except in skin folds where the plaques often appear shiny and they may have a moist peeling surface. The most common sites are scalp, elbows and knees, but any part of the skin can be involved. The plaques are usually very persistent without treatment.  Itch is mostly mild but may be severe in some patients, leading to scratching and lichenification (thickened leathery skin with increased skin markings). Painful skin cracks or fissures may occur.  When psoriatic plaques clear up, they may leave brown or pale marks that can be expected to fade over several months.    Guttate psoriasis: When someone gets this type of psoriasis, you often see tiny bumps appear on the skin quite suddenly. The bumps tend to cover much of the torso, legs, and arms. Sometimes, the bumps also develop on the face, scalp, and ears. No matter where they appear, the bumps tend to be:   Small and  scaly  [place]-colored to pink  Temporary, clearing in a few weeks or months without treatment  When guttate psoriasis clears, it may never return. Why this happens is still a bit of a mystery. Guttate psoriasis tends to develop in children and young adults who've had an infection, such as strep throat. It's possible that when the infection clears so does guttate psoriasis.  It's also possible to have:  Guttate psoriasis for life  See the guttate psoriasis clear and plaque psoriasis develop later in life  Plaque psoriasis when you develop guttate psoriasis  There's no way to predict what will happen after the first flare-up of guttate psoriasis clears.    Inverse psoriasis: This type of psoriasis develops in areas where skin touches skin, such as the armpits, genitals, and crease of the buttocks. Where the inverse psoriasis appears, you're likely to notice:  Smooth, red patches of skin that look raw  Little, if any, silvery-white coating  Sore or painful skin  Other names for this type of psoriasis are intertriginous psoriasis or flexural psoriasis.  Pustular psoriasis: This type of psoriasis causes pus-filled bumps that usually appear only on the feet and hands. While the pus-filled bumps may look like an infection, the skin is not infected. The bumps don't contain bacteria or anything else that could cause an infection.  Where pustular psoriasis appears, you tend to notice:  Red, swollen skin that is dotted with pus-filled bumps  Extremely sore or painful skin  Brown dots (and sometimes scale) appear as the pus-filled bumps dry  Pustular psoriasis can make just about any activity that requires your hands or feet, such as typing or walking, unbearably painful.    Pustular psoriasis (generalized): Serious and life-threatening, this rare type of psoriasis causes pus-filled bumps to develop on much of the skin. Also called von Zumbusch psoriasis, a flare-up causes this sequence of events:  Skin on most of the body  suddenly turns dry, red, and tender.  Within hours, pus-filled bumps cover most of the skin.  Often within a day, the pus-filled bumps break open and pools of pus leak onto the skin.  As the pus dries (usually within 24 to 48 hours), the skin dries out and peels (as shown in this picture).  When the dried skin peels off, you see a smooth, glazed surface.  In a few days or weeks, you may see a new crop of pus-filled bumps covering most of the skin, as the cycle repeats itself.  Anyone with pustular psoriasis also feels very sick, and may develop a fever, headache, muscle weakness, and other symptoms. Medical care is often necessary to save the person's life.    Erythrodermic psoriasis: Serious and life-threatening, this type of psoriasis requires immediate medical care. When someone develops erythrodermic psoriasis, you may notice:  Skin on most of the body looks burnt  Chills, fever, and the person looks extremely ill  Muscle weakness, a rapid pulse, and severe itch  The person may also be unable to keep warm, so hypothermia can set in quickly.  Most people who develop this type of psoriasis already have another type of psoriasis. Before developing erythrodermic psoriasis, they often notice that their psoriasis is worsening or not improving with treatment. If you notice either of these happening, see a board-certified dermatologist.    Nails    Nail psoriasis: With any type of psoriasis, you may see changes to your fingernails or toenails. About half of the people who have plaque psoriasis see signs of psoriasis on their fingernails at some point2.  When psoriasis affects the nails, you may notice:  Tiny dents in your nails (called “nail pits”)  White, yellow, or brown discoloration under one or more nails  Crumbling, rough nails  A nail lifting up so that it's no longer attached  Buildup of skin cells beneath one or more nails, which lifts up the nail  Treatment and proper nail care can help you control nail  psoriasis.    Psoriatic arthritis: If you have psoriasis, it's important to pay attention to your joints. Some people who have psoriasis develop a type of arthritis called psoriatic arthritis. This is more likely to occur if you have severe psoriasis.  Most people notice psoriasis on their skin years before they develop psoriatic arthritis. It's also possible to get psoriatic arthritis before psoriasis, but this is less common.  When psoriatic arthritis develops, the signs can be subtle. At first, you may notice:  A swollen and tender joint, especially in a finger or toe  Heel pain  Swelling on the back of your leg, just above your heel  Stiffness in the morning that fades during the day  Like psoriasis, psoriatic arthritis cannot be cured. Treatment can prevent psoriatic arthritis from worsening, which is important. Allowed to progress, psoriatic arthritis can become disabling.    Diagnosis and treatment of psoriasis   Psoriasis is usually diagnosed by clinical features, and skin biopsy if necessary.   It is important to decrease factors that aggravate psoriasis. These include treating streptococcal infections, minimizing skin injuries, avoiding sun exposure if it exacerbates psoriasis, smoking, alcohol usage, decreasing stress, and maintaining an optimal body weight. Certain medications such as lithium, beta blockers, antimalarials, and NSAIDs have also been implicated. Suddenly stopping oral steroids or strong topical steroids can cause rebound disease.     There are many categories of psoriasis treatments available.     Topical therapy  Mild psoriasis is generally treated with topical agents alone. Which treatment is selected may depend on body site, extent and severity of psoriasis.  Emollients  Coal tar preparations  Dithranol  Salicylic acid  Vitamin D analogue (calcipotriol)  Topical corticosteroids  Calcineurin inhibitor (tacrolimus, pimecrolimus)  Phototherapy  Most psoriasis centres offer phototherapy  with ultraviolet (UV) radiation, often in combination with topical or systemic agents. Types of phototherapy include  Narrowband UVB  Broadband UVB  Photochemotherapy (PUVA)  Targeted phototherapy  Systemic therapy  Moderate to severe psoriasis warrants treatment with a systemic agent and/or phototherapy. The most common treatments are:  Methotrexate  Ciclosporin  Acitretin  Other medicines occasionally used for psoriasis include:  Mycophenolate  Apremilast  Hydroxyurea  Azathioprine  6-mercaptopurine  Systemic corticosteroids are best avoided due to a risk of severe withdrawal flare of psoriasis and adverse effects.  Biologics or targeted therapies are reserved for conventional treatment-resistant severe psoriasis, mainly because of expense, as side effects compare favorably with other systemic agents. These include:  Anti-tumour necrosis factor-alpha antagonists (anti-TNF?) infliximab, adalimumab and etanercept  The interleukin (IL)-12/23 antagonist ustekinumab  IL-17 antagonists such as secukinumab  Many other monoclonal antibodies are under investigation in the treatment of psoriasis.      HISTORY OF RECURRENT OF ERYTHEMA MULTIFORME   Physical Exam:  Anatomic Location Affected: generalized  Morphological Description:  not active at this time  Pertinent Positives:  Pertinent Negatives:     Additional History of Present Condition:  Has been on Chronic Suppressive therapy with Valacyclovir     Assessment and Plan:  Based on a thorough discussion of this condition and the management approach to it (including a comprehensive discussion of the known risks, side effects and potential benefits of treatment), the patient (family) agrees to implement the following specific plan:  Continue same therapy of valacyclovir 500 mg once daily    Scribe Attestation      I,:  Cathleen Lacey am acting as a scribe while in the presence of the attending physician.:       I,:  Mustapha Foy MD personally performed the services  described in this documentation    as scribed in my presence.:

## 2025-02-12 ENCOUNTER — HOSPITAL ENCOUNTER (OUTPATIENT)
Dept: NON INVASIVE DIAGNOSTICS | Facility: HOSPITAL | Age: 70
Discharge: HOME | End: 2025-02-12
Payer: MEDICARE

## 2025-02-12 DIAGNOSIS — M14.672 CHARCOT'S JOINT, LEFT ANKLE AND FOOT: ICD-10-CM

## 2025-02-12 PROCEDURE — 93922 UPR/L XTREMITY ART 2 LEVELS: CPT

## 2025-02-12 PROCEDURE — 93922 UPR/L XTREMITY ART 2 LEVELS: CPT | Performed by: SURGERY

## 2025-02-18 DIAGNOSIS — Z00.6 ENCOUNTER FOR EXAMINATION FOR NORMAL COMPARISON OR CONTROL IN CLINICAL RESEARCH PROGRAM: ICD-10-CM

## 2025-02-20 ENCOUNTER — TELEPHONE (OUTPATIENT)
Dept: OBGYN CLINIC | Facility: HOSPITAL | Age: 70
End: 2025-02-20

## 2025-02-20 NOTE — TELEPHONE ENCOUNTER
Hello,    Please advise if a forced appointment can be accommodated for the patient:    Call back #: Phone:   [phone]     Insurance: Medicare     Reason for appointment: L foot grant fx currently scheduled for 3/3. Would like to be called for sooner if possible     Requested doctor and/or location: [facility]       Thank you.

## 2025-02-27 NOTE — TELEPHONE ENCOUNTER
Pt was scheduled with dr lachman.  I received  staff message from  him.  Pt is to see podiatry  he does not treat [place]. I left detailed message for pt and cx his apt.

## 2025-03-04 ENCOUNTER — OFFICE VISIT (OUTPATIENT)
Dept: PODIATRY | Facility: CLINIC | Age: 70
End: 2025-03-04
Payer: MEDICARE

## 2025-03-04 ENCOUNTER — APPOINTMENT (OUTPATIENT)
Dept: RADIOLOGY | Facility: CLINIC | Age: 70
End: 2025-03-04
Payer: MEDICARE

## 2025-03-04 VITALS — WEIGHT: 219 LBS | HEIGHT: 73 IN | BODY MASS INDEX: 29.03 KG/M2

## 2025-03-04 DIAGNOSIS — R26.0 CHARCOT GAIT: ICD-10-CM

## 2025-03-04 DIAGNOSIS — M79.672 LEFT FOOT PAIN: Primary | ICD-10-CM

## 2025-03-04 DIAGNOSIS — S92.255A NONDISPLACED FRACTURE OF NAVICULAR (SCAPHOID) OF LEFT FOOT, INITIAL ENCOUNTER FOR CLOSED FRACTURE: ICD-10-CM

## 2025-03-04 DIAGNOSIS — E11.42 CONTROLLED TYPE 2 DIABETES MELLITUS WITH DIABETIC POLYNEUROPATHY, WITHOUT LONG-TERM CURRENT USE OF INSULIN (HCC): ICD-10-CM

## 2025-03-04 DIAGNOSIS — M79.672 LEFT FOOT PAIN: ICD-10-CM

## 2025-03-04 DIAGNOSIS — Z79.01 LONG TERM CURRENT USE OF ANTICOAGULANT: ICD-10-CM

## 2025-03-04 PROCEDURE — 99203 OFFICE O/P NEW LOW 30 MIN: CPT | Performed by: PODIATRIST

## 2025-03-04 PROCEDURE — 73630 X-RAY EXAM OF FOOT: CPT

## 2025-03-04 NOTE — PROGRESS NOTES
Name: Santino Cooney      : 1955      MRN: [id number]  Encounter Provider: Guy Munson DPM  Encounter Date: 3/4/2025   Encounter department: [facility] PODIATRY [place]  :  Assessment & Plan  Nondisplaced fracture of navicular (scaphoid) of left foot, initial encounter for closed fracture    Orders:    Ambulatory Referral to Orthopedic Surgery    Left foot pain    Orders:    XR foot 3+ vw left; Future    Charcot gait    Orders:    XR foot 3+ vw left; Future    Long term current use of anticoagulant         Controlled type 2 diabetes mellitus with diabetic polyneuropathy, without long-term current use of insulin ([facility])    Lab Results   Component Value Date    HGBA1C 6.6 (H) 10/16/2024            Dispensed and applied quarter inch felt pads with pressure center part removed.  2 pads were placed in his Crow walker at areas of constant pressure.  Recommended for now the patient states that his Crow walker.  Suggested over-the-counter lotion to be applied on the foot to keep the skin soft and pliable.  Referred the patient to Dr. Valadez for Charcot joint evaluation and treatment.    Return if symptoms worsen or fail to improve.     History of Present Illness   HPI  Santino Cooney is a 70 y.o. male who presents for treatment of his Charcot joint.  Patient is currently in a Crow walker and is a diabetic with peripheral neuropathy.  He also has an appointment with Doppler studies as well as vascular surgeon to ensure that circulation is good for any type of healing that he may need.  History obtained from: patient    Review of Systems  Medical History Reviewed by provider this encounter:     .  Current Outpatient Medications on File Prior to Visit   Medication Sig Dispense Refill    acetaminophen (TYLENOL) 325 mg tablet Tylenol 325 mg tablet      aspirin 81 mg chewable tablet Chew 81 mg daily      BD Pen Needle Keke U/F 32G X 4 MM MISC       cilostazol (PLETAL) 100 mg tablet Take 100 mg by mouth 2 (two)  times a day      clopidogrel (PLAVIX) 75 mg tablet Take 75 mg by mouth daily      fluticasone (FLONASE) 50 mcg/act nasal spray 1 spray into each nostril 2 (two) times a day 16 g 3    ibuprofen (MOTRIN) 800 mg tablet Take by mouth      insulin glargine (LANTUS SOLOSTAR) 100 units/mL injection pen Inject under the skin daily      insulin lispro (HumaLOG) 100 units/mL injection Inject under the skin 3 (three) times a day before meals      levalbuterol (XOPENEX HFA) 45 mcg/act inhaler Inhale 1 puff every 6 (six) hours as needed for wheezing 15 g 3    levocetirizine (XYZAL) 5 MG tablet Take 1 tablet (5 mg total) by mouth daily 90 tablet 0    LORazepam (ATIVAN) 0.5 mg tablet Take by mouth every 8 (eight) hours as needed for anxiety      multivitamin (THERAGRAN) TABS Take by mouth      niacin 100 mg tablet Take 100 mg by mouth daily with breakfast      omega-3-acid ethyl esters (Lovaza) 1 g capsule Take 2 g by mouth 2 (two) times a day      sertraline (ZOLOFT) 100 mg tablet Take 100 mg by mouth daily      umeclidinium-vilanterol (Anoro Ellipta) 62.5-25 mcg/actuation inhaler Inhale 1 puff daily      canagliflozin (INVOKANA) 100 mg Invokana 100 mg tablet (Patient not taking: Reported on 4/17/2024)      cyclobenzaprine (FLEXERIL) 10 mg tablet Take 10 mg by mouth Three times daily as needed (Patient not taking: Reported on 3/4/2025)      HYDROcodone-acetaminophen (NORCO) 7.5-325 mg per tablet hydrocodone 7.5 mg-acetaminophen 325 mg tablet (Patient not taking: Reported on 4/17/2024)      losartan (COZAAR) 25 mg tablet Take 12.5 mg by mouth daily (Patient not taking: Reported on 3/4/2025)      OXYMETAZOLINE HCL, OPHTH, 0.025 % SOLN 2 sprays into each nostril (Patient not taking: Reported on 4/17/2024)      valACYclovir (VALTREX) 500 mg tablet Take 1 tablet (500 mg total) by mouth daily (Patient not taking: Reported on 3/4/2025) 90 tablet 3     No current facility-administered medications on file prior to visit.      Social  "History     Tobacco Use    Smoking status: Never    Smokeless tobacco: Never   Substance and Sexual Activity    Alcohol use: Never    Drug use: Never    Sexual activity: Not on file        Objective   Ht 6' 1\" (1.854 m)   Wt 99.3 kg (219 lb)   BMI 28.89 kg/m²      Personally reviewed the x-rays from today and they show destruction of the midfoot area in the area of the navicular cuneiform junctions and cuboid.  The talus is pointing plantarly cuneiforms are still in place.  There is some remodeling already started in the midfoot area.    Physical Exam  Vascular status is 0/4 DP feet 1/4 PT negative digital hair, normal distal cooling, and immediate capillary refill left extremity.  There is slight pitting edema present on the left extremity of +1.    Derm there is white flaky tissue present on the plantar aspect of the left foot.  There is loss of turgor and thinning of the skin noted on the left extremity.  There is hyperpigmentation noted at the base of the fifth metatarsal laterally secondary to constant pressure.  No breakdowns in the skin are noted    Ortho there is medial column collapse and whitening of the foot on the left extremity.  No warmth or erythema areas are noted.    Neuro is diminished on the left extremity and 0.5 monofilament test was performed and it was absent at all 5 sites.  The sites that were tested were the plantar aspect of the hallux, plantar aspect of the third and fourth toes, submet 3, and the plantar aspect of the arch.    Administrative Statements   I have spent a total time of 15 minutes in caring for this patient on the day of the visit/encounter including Risks and benefits of tx options, Instructions for management, Patient and family education, Importance of tx compliance, Risk factor reductions, Counseling / Coordination of care, Documenting in the medical record, and Obtaining or reviewing history  .  "

## 2025-03-20 ENCOUNTER — OFFICE VISIT (OUTPATIENT)
Dept: PODIATRY | Facility: CLINIC | Age: 70
End: 2025-03-20
Payer: MEDICARE

## 2025-03-20 ENCOUNTER — OFFICE VISIT (OUTPATIENT)
Age: 70
End: 2025-03-20
Payer: MEDICARE

## 2025-03-20 VITALS
BODY MASS INDEX: 30.34 KG/M2 | TEMPERATURE: 97.5 F | OXYGEN SATURATION: 95 % | WEIGHT: 230 LBS | DIASTOLIC BLOOD PRESSURE: 70 MMHG | SYSTOLIC BLOOD PRESSURE: 154 MMHG | RESPIRATION RATE: 18 BRPM | HEART RATE: 94 BPM

## 2025-03-20 VITALS — WEIGHT: 219 LBS | HEIGHT: 73 IN | BODY MASS INDEX: 29.03 KG/M2

## 2025-03-20 DIAGNOSIS — Z91.018 NUT ALLERGY: ICD-10-CM

## 2025-03-20 DIAGNOSIS — M14.672 CHARCOT JOINT OF LEFT FOOT: Primary | ICD-10-CM

## 2025-03-20 DIAGNOSIS — L30.9 DERMATITIS: Primary | ICD-10-CM

## 2025-03-20 DIAGNOSIS — I77.1 ARTERIAL STENOSIS (HCC): ICD-10-CM

## 2025-03-20 PROCEDURE — 99214 OFFICE O/P EST MOD 30 MIN: CPT | Performed by: PODIATRIST

## 2025-03-20 PROCEDURE — G0463 HOSPITAL OUTPT CLINIC VISIT: HCPCS | Performed by: PHYSICIAN ASSISTANT

## 2025-03-20 PROCEDURE — 99213 OFFICE O/P EST LOW 20 MIN: CPT | Performed by: PHYSICIAN ASSISTANT

## 2025-03-20 RX ORDER — EPINEPHRINE 0.3 MG/.3ML
0.3 INJECTION SUBCUTANEOUS ONCE AS NEEDED
Qty: 0.6 ML | Refills: 0 | Status: SHIPPED | OUTPATIENT
Start: 2025-03-20

## 2025-03-20 RX ORDER — EPINEPHRINE 0.3 MG/.3ML
0.3 INJECTION SUBCUTANEOUS ONCE
Qty: 0.6 ML | Refills: 0 | Status: SHIPPED | OUTPATIENT
Start: 2025-03-20 | End: 2025-03-20 | Stop reason: SDUPTHER

## 2025-03-20 RX ORDER — DIPHENHYDRAMINE HCL 25 MG
25 TABLET ORAL EVERY 6 HOURS PRN
Start: 2025-03-20

## 2025-03-20 NOTE — PROGRESS NOTES
Name: Santino Cooney      : 1955      MRN: [id number]  Encounter Provider: Shane Valadez DPM  Encounter Date: 3/20/2025   Encounter department: [facility] PODIATRY [place]  :  Assessment & Plan  Charcot joint of left foot         Arterial stenosis (HCC)         -His diabetic control is excellent, he is getting ready to go undergo a angiogram with intervention for his left lower extremity due to his previous arterial results  - She is following with [facility] on 3/27 and we have spent the rest of the appointment discussing potential intervention on the left foot his overall prognosis and the potential complications in the future  - I would recommend at this point due to the new onset overload on the plantar lateral aspect of the left foot going more or less nonweightbearing if he can do it  -Would recommend that he purchase a knee scooter    History of Present Illness   HPI  Santino Cooney is a 70 y.o. male who presents evaluation management of his left foot, he has had Charcot for the past 2 to 3 years, seen multiple physicians.  But over the past 6 months it became more swollen and the position of the foot has changed.  He presents today ambulating in cam boot, he does have an appointment with [facility] on 3/27.  He also had a arterial duplex at the end of February but this has not been gone over with him.      Review of Systems   Constitutional:  Negative for chills and fever.   HENT:  Negative for ear pain and sore throat.    Eyes:  Negative for pain and visual disturbance.   Respiratory:  Negative for cough and shortness of breath.    Cardiovascular:  Negative for chest pain and palpitations.   Gastrointestinal:  Negative for abdominal pain and vomiting.   Genitourinary:  Negative for dysuria and hematuria.   Musculoskeletal:  Negative for arthralgias and back pain.   Skin:  Negative for color change and rash.   Neurological:  Negative for seizures and syncope.   All other  "systems reviewed and are negative.         Objective   Ht 6' 1\" (1.854 m)   Wt 99.3 kg (219 lb)   BMI 28.89 kg/m²      Physical Exam  Vitals reviewed.   Constitutional:       Appearance: Normal appearance.   HENT:      Head: Normocephalic and atraumatic.      Nose: Nose normal.   Eyes:      Pupils: Pupils are equal, round, and reactive to light.   Pulmonary:      Effort: Pulmonary effort is normal.   Skin:     Capillary Refill: Capillary refill takes 2 to 3 seconds.      Comments: He does have prominence plantarly left of his cuboid, there is free hemorrhagic callus forming, no actual ulceration yet, DP PT pulses are nonpalpable.  If there is increased range of motion across the midfoot with warmth and swelling.  Asymmetric pes planus    Neurological:      General: No focal deficit present.      Mental Status: He is alert and oriented to person, place, and time. Mental status is at baseline.           "

## 2025-03-20 NOTE — PROGRESS NOTES
[facility]        NAME: Santino Cooney is a 70 y.o. male  : 1955    MRN: [id number]  DATE: 2025  TIME: 7:37 PM    Assessment and Plan   Dermatitis [L30.9]  1. Dermatitis  diphenhydrAMINE (BENADRYL) 25 mg tablet      2. Nut allergy  EPINEPHrine (EPIPEN) 0.3 mg/0.3 mL SOAJ    DISCONTINUED: EPINEPHrine (EPIPEN) 0.3 mg/0.3 mL SOAJ          Discussed possibility of short course of steroids with the patient he reports having bad GI and psychiatric side effects from steroids, therefore for his rash will utilize Benadryl when she is not taking that.  He was given a box here through home*pharmacy and he will take this at home I advised him not to drive on the medication.  Advised him if the rash worsens to go to the ER.  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    The patient and/or parent/legal guardian verbalized understanding of exam findings and   Treatment plan. We engaged in the shared decision-making process and treatment options were   discussed at length with the patient.  All questions, concerns and complaints were answered and   addressed to the patient's' and/or parent/legal guardians's satisfaction.    Patient Instructions   There are no Patient Instructions on file for this visit.    Follow up with PCP in 3-5 days.  Proceed to  ER if symptoms worsen.    If tests are performed, our office will contact you with results only if   changes need to made to the care plan discussed with you at the visit.   [facility]     Chief Complaint     Chief Complaint   Patient presents with   • Rash     Rash pink raised dermatitis noted on trunk and back as well as legs and arms. States he just completed bactrim 7 days ago for an ulcer on foot         History of Present Illness       HPI  Pt noted a rash  on the abdomen on trunk and back as well as legs and arms, feels it may have been present yesterday started itching today. Just finished bactrim 7 days ago for foot ulcer.     Review of Systems   Review of Systems  All other related systems reviewed with patient or accompanying historian and are negative except as noted in HPI    Current Medications       Current Outpatient Medications:   •  acetaminophen (TYLENOL) 325 mg tablet, Tylenol 325 mg tablet, Disp: , Rfl:   •  aspirin 81 mg chewable tablet, Chew 81 mg daily, Disp: , Rfl:   •  BD Pen Needle Keke U/F 32G X 4 MM MISC, , Disp: , Rfl:   •  cilostazol (PLETAL) 100 mg tablet, Take 100 mg by mouth 2 (two) times a day, Disp: , Rfl:   •  diphenhydrAMINE (BENADRYL) 25 mg tablet, Take 1 tablet (25 mg total) by mouth every 6 (six) hours as needed for itching, Disp: , Rfl:   •  EPINEPHrine (EPIPEN) 0.3 mg/0.3 mL SOAJ, Inject 0.3 mL (0.3 mg total) into a muscle once as needed for anaphylaxis for up to 1 dose, Disp: 0.6 mL, Rfl: 0  •  ibuprofen (MOTRIN) 800 mg tablet, Take by mouth, Disp: , Rfl:   •  insulin glargine (LANTUS SOLOSTAR) 100 units/mL injection pen, Inject under the skin daily, Disp: , Rfl:   •  insulin lispro (HumaLOG) 100 units/mL injection, Inject under the skin 3 (three) times a day before meals, Disp: , Rfl:   •  levalbuterol (XOPENEX HFA) 45 mcg/act inhaler, Inhale 1 puff every 6 (six) hours as needed for wheezing, Disp: 15 g, Rfl: 3  •  levocetirizine (XYZAL) 5 MG tablet, Take 1 tablet (5 mg total) by mouth daily, Disp: 90 tablet, Rfl: 0  •  LORazepam (ATIVAN) 0.5 mg tablet, Take by mouth every 8 (eight) hours as needed for anxiety, Disp: , Rfl:   •  multivitamin (THERAGRAN) TABS, Take by mouth, Disp: , Rfl:   •  sertraline (ZOLOFT) 100 mg tablet, Take 100 mg by mouth daily, Disp: , Rfl:   •  canagliflozin (INVOKANA) 100 mg, Invokana 100 mg tablet (Patient not taking: Reported on 4/17/2024), Disp: , Rfl:   •  clopidogrel (PLAVIX) 75 mg tablet, Take 75  mg by mouth daily (Patient not taking: Reported on 3/20/2025), Disp: , Rfl:   •  cyclobenzaprine (FLEXERIL) 10 mg tablet, Take 10 mg by mouth Three times daily as needed (Patient not taking: Reported on 3/4/2025), Disp: , Rfl:   •  fluticasone (FLONASE) 50 mcg/act nasal spray, 1 spray into each nostril 2 (two) times a day (Patient not taking: Reported on 3/20/2025), Disp: 16 g, Rfl: 3  •  HYDROcodone-acetaminophen (NORCO) 7.5-325 mg per tablet, hydrocodone 7.5 mg-acetaminophen 325 mg tablet (Patient not taking: Reported on 4/17/2024), Disp: , Rfl:   •  losartan (COZAAR) 25 mg tablet, Take 12.5 mg by mouth daily (Patient not taking: Reported on 3/4/2025), Disp: , Rfl:   •  niacin 100 mg tablet, Take 100 mg by mouth daily with breakfast, Disp: , Rfl:   •  omega-3-acid ethyl esters (Lovaza) 1 g capsule, Take 2 g by mouth 2 (two) times a day, Disp: , Rfl:   •  OXYMETAZOLINE HCL, OPHTH, 0.025 % SOLN, 2 sprays into each nostril (Patient not taking: Reported on 4/17/2024), Disp: , Rfl:   •  umeclidinium-vilanterol (Anoro Ellipta) 62.5-25 mcg/actuation inhaler, Inhale 1 puff daily, Disp: , Rfl:   •  valACYclovir (VALTREX) 500 mg tablet, Take 1 tablet (500 mg total) by mouth daily (Patient not taking: Reported on 3/4/2025), Disp: 90 tablet, Rfl: 3    Current Allergies     Allergies as of 03/20/2025 - Reviewed 03/20/2025   Allergen Reaction Noted   • Chocolate hazelnut flavor - food allergy Anaphylaxis 07/11/2018   • Nuts - food allergy Itching and Rash 1955   • Peanut oil - food allergy Anaphylaxis 07/03/2023   • Aspirin Other (See Comments) 07/27/2017   • Ciprofloxacin  09/13/2017   • Corticosteroids  02/12/2014   • Dulaglutide Other (See Comments) 04/03/2019   • Ezetimibe Other (See Comments) 10/01/2020   • Fenofibrate  07/27/2017   • Hydrocodone-acetaminophen Other (See Comments) 08/13/2023   • Metformin  07/27/2017   • Niacin Myalgia 09/30/2024   • Penicillin g  09/13/2017   • Penicillins  02/12/2014   • Statins   02/12/2014            The following portions of the patient's history were reviewed and updated as appropriate: allergies, current medications, past family history, past medical history, past social history, past surgical history and problem list.     Past Medical History:   Diagnosis Date   • Asthma    • BPH (benign prostatic hyperplasia)    • Charcot foot due to diabetes mellitus (HCC)    • Claudication (HCC)    • Diabetes mellitus (HCC)    • Hyperlipidemia    • Hypertension    • Peripheral neuropathy    • Peripheral vascular disease (HCC)    • Spinal stenosis    • Stroke (HCC)        Past Surgical History:   Procedure Laterality Date   • LIVER BIOPSY     • ROTATOR CUFF REPAIR     • TONSILLECTOMY         Family History   Problem Relation Age of Onset   • Heart disease Father    • COPD Father    • Stroke Sister          Medications have been verified.        Objective   /70 (Patient Position: Sitting, Cuff Size: Adult)   Pulse 94   Temp 97.5 °F (36.4 °C) (Skin)   Resp 18   Wt 104 kg (230 lb)   SpO2 95%   BMI 30.34 kg/m²   No LMP for male patient.       Physical Exam     Physical Exam  Constitutional:       General: He is not in acute distress.     Appearance: He is well-developed.   HENT:      Head: Normocephalic and atraumatic.      Mouth/Throat:      Comments: No angioedema  Eyes:      General: No scleral icterus.     Conjunctiva/sclera: Conjunctivae normal.   Cardiovascular:      Rate and Rhythm: Normal rate and regular rhythm.      Heart sounds: Normal heart sounds. No murmur heard.  Pulmonary:      Effort: Pulmonary effort is normal. No respiratory distress.      Breath sounds: No stridor. No wheezing, rhonchi or rales.   Musculoskeletal:      Cervical back: Normal range of motion and neck supple.   Skin:     General: Skin is warm and dry.      Findings: Rash present.      Comments: Diffuse urticarial rash present over the abdomen chest to a lesser degree the proximal extremities.   Neurological:  "     Mental Status: He is alert and oriented to person, place, and time.   Psychiatric:         Behavior: Behavior normal.         Ortho Exam        Procedures  No Procedures performed today        Note: Portions of this record may have been created with voice recognition software. Occasional wrong word or \"sound a like\" substitutions may have occurred due to the inherent limitations of voice recognition software. Please read the chart carefully and recognize, using context, where substitutions have occurred.*      "

## 2025-03-21 ENCOUNTER — OFFICE VISIT (OUTPATIENT)
Age: 70
End: 2025-03-21
Payer: MEDICARE

## 2025-03-21 VITALS
WEIGHT: 230 LBS | TEMPERATURE: 97.7 F | BODY MASS INDEX: 30.48 KG/M2 | HEART RATE: 78 BPM | HEIGHT: 73 IN | RESPIRATION RATE: 18 BRPM | OXYGEN SATURATION: 98 % | DIASTOLIC BLOOD PRESSURE: 64 MMHG | SYSTOLIC BLOOD PRESSURE: 144 MMHG

## 2025-03-21 DIAGNOSIS — J02.9 ACUTE PHARYNGITIS, UNSPECIFIED ETIOLOGY: ICD-10-CM

## 2025-03-21 DIAGNOSIS — R21 RASH: Primary | ICD-10-CM

## 2025-03-21 LAB — S PYO AG THROAT QL: NEGATIVE

## 2025-03-21 PROCEDURE — 87880 STREP A ASSAY W/OPTIC: CPT | Performed by: PHYSICIAN ASSISTANT

## 2025-03-21 PROCEDURE — G0463 HOSPITAL OUTPT CLINIC VISIT: HCPCS | Performed by: PHYSICIAN ASSISTANT

## 2025-03-21 PROCEDURE — 87070 CULTURE OTHR SPECIMN AEROBIC: CPT | Performed by: PHYSICIAN ASSISTANT

## 2025-03-21 PROCEDURE — 99213 OFFICE O/P EST LOW 20 MIN: CPT | Performed by: PHYSICIAN ASSISTANT

## 2025-03-21 NOTE — PATIENT INSTRUCTIONS
Pharyngitis  Rash  Continue taking over-the-counter Benadryl as needed  Follow-up with PCP  Follow up with PCP in 3-5 days.  Proceed to  ER if symptoms worsen.

## 2025-03-21 NOTE — PROGRESS NOTES
[facility]        NAME: Santino Cooney is a 70 y.o. male  : 1955    MRN: [id number]  DATE: 2025  TIME: 10:44 AM    Assessment and Plan   Rash [R21]  1. Rash        2. Acute pharyngitis, unspecified etiology              Patient Instructions     Pharyngitis  Rash  Continue taking over-the-counter Benadryl as needed  Follow-up with PCP  Follow up with PCP in 3-5 days.  Proceed to  ER if symptoms worsen.    Chief Complaint     Chief Complaint   Patient presents with    Cold Like Symptoms     Started 4 days ago, patient complains of sore throat, cough, fever.    Rash     Started 3 days ago, patient complains of generalized rash.          History of Present Illness       70-year-old male with past medical history of diabetes who presents complaining of itchy rash to the torso.  Patient states that the rash started after finishing a course of antibiotics recently.  Spoke to his PCP who asked him to return to have his throat swabbed to rule out a strep.  Denies fever, chills, chest pain, shortness of breath.    Rash  Associated symptoms include a sore throat. Pertinent negatives include no congestion, cough, rhinorrhea or shortness of breath.       Review of Systems   Review of Systems   Constitutional: Negative.    HENT:  Positive for sore throat. Negative for congestion, dental problem, drooling, ear pain, postnasal drip, rhinorrhea, sinus pain, trouble swallowing and voice change.    Eyes: Negative.    Respiratory: Negative.  Negative for apnea, cough, choking, chest tightness, shortness of breath, wheezing and stridor.    Cardiovascular: Negative.  Negative for chest pain.   Skin:  Positive for rash.         Current Medications       Current Outpatient Medications:     acetaminophen (TYLENOL) 325 mg tablet, Tylenol 325 mg tablet, Disp: , Rfl:     aspirin 81 mg chewable tablet, Chew 81 mg daily, Disp: , Rfl:     BD Pen Needle Keke U/F 32G X 4 MM MISC, , Disp: , Rfl:     canagliflozin  (INVOKANA) 100 mg, Invokana 100 mg tablet (Patient not taking: Reported on 4/17/2024), Disp: , Rfl:     cilostazol (PLETAL) 100 mg tablet, Take 100 mg by mouth 2 (two) times a day, Disp: , Rfl:     clopidogrel (PLAVIX) 75 mg tablet, Take 75 mg by mouth daily (Patient not taking: Reported on 3/20/2025), Disp: , Rfl:     cyclobenzaprine (FLEXERIL) 10 mg tablet, Take 10 mg by mouth Three times daily as needed (Patient not taking: Reported on 3/4/2025), Disp: , Rfl:     diphenhydrAMINE (BENADRYL) 25 mg tablet, Take 1 tablet (25 mg total) by mouth every 6 (six) hours as needed for itching, Disp: , Rfl:     EPINEPHrine (EPIPEN) 0.3 mg/0.3 mL SOAJ, Inject 0.3 mL (0.3 mg total) into a muscle once as needed for anaphylaxis for up to 1 dose, Disp: 0.6 mL, Rfl: 0    fluticasone (FLONASE) 50 mcg/act nasal spray, 1 spray into each nostril 2 (two) times a day (Patient not taking: Reported on 3/20/2025), Disp: 16 g, Rfl: 3    HYDROcodone-acetaminophen (NORCO) 7.5-325 mg per tablet, hydrocodone 7.5 mg-acetaminophen 325 mg tablet (Patient not taking: Reported on 4/17/2024), Disp: , Rfl:     ibuprofen (MOTRIN) 800 mg tablet, Take by mouth, Disp: , Rfl:     insulin glargine (LANTUS SOLOSTAR) 100 units/mL injection pen, Inject under the skin daily, Disp: , Rfl:     insulin lispro (HumaLOG) 100 units/mL injection, Inject under the skin 3 (three) times a day before meals, Disp: , Rfl:     levalbuterol (XOPENEX HFA) 45 mcg/act inhaler, Inhale 1 puff every 6 (six) hours as needed for wheezing, Disp: 15 g, Rfl: 3    levocetirizine (XYZAL) 5 MG tablet, Take 1 tablet (5 mg total) by mouth daily, Disp: 90 tablet, Rfl: 0    LORazepam (ATIVAN) 0.5 mg tablet, Take by mouth every 8 (eight) hours as needed for anxiety, Disp: , Rfl:     losartan (COZAAR) 25 mg tablet, Take 12.5 mg by mouth daily (Patient not taking: Reported on 3/4/2025), Disp: , Rfl:     multivitamin (THERAGRAN) TABS, Take by mouth, Disp: , Rfl:     niacin 100 mg tablet, Take 100  mg by mouth daily with breakfast, Disp: , Rfl:     omega-3-acid ethyl esters (Lovaza) 1 g capsule, Take 2 g by mouth 2 (two) times a day, Disp: , Rfl:     OXYMETAZOLINE HCL, OPHTH, 0.025 % SOLN, 2 sprays into each nostril (Patient not taking: Reported on 4/17/2024), Disp: , Rfl:     sertraline (ZOLOFT) 100 mg tablet, Take 100 mg by mouth daily (Patient not taking: Reported on 3/21/2025), Disp: , Rfl:     umeclidinium-vilanterol (Anoro Ellipta) 62.5-25 mcg/actuation inhaler, Inhale 1 puff daily, Disp: , Rfl:     valACYclovir (VALTREX) 500 mg tablet, Take 1 tablet (500 mg total) by mouth daily (Patient not taking: Reported on 3/4/2025), Disp: 90 tablet, Rfl: 3    Current Allergies     Allergies as of 03/21/2025 - Reviewed 03/21/2025   Allergen Reaction Noted    Chocolate hazelnut flavor - food allergy Anaphylaxis 07/11/2018    Nuts - food allergy Itching and Rash 1955    Peanut oil - food allergy Anaphylaxis 07/03/2023    Aspirin Other (See Comments) 07/27/2017    Ciprofloxacin  09/13/2017    Corticosteroids  02/12/2014    Dulaglutide Other (See Comments) 04/03/2019    Ezetimibe Other (See Comments) 10/01/2020    Fenofibrate  07/27/2017    Hydrocodone-acetaminophen Other (See Comments) 08/13/2023    Metformin  07/27/2017    Niacin Myalgia 09/30/2024    Penicillin g  09/13/2017    Penicillins  02/12/2014    Statins  02/12/2014            The following portions of the patient's history were reviewed and updated as appropriate: allergies, current medications, past family history, past medical history, past social history, past surgical history and problem list.     Past Medical History:   Diagnosis Date    Asthma     BPH (benign prostatic hyperplasia)     Charcot foot due to diabetes mellitus (HCC)     Claudication (HCC)     Diabetes mellitus (HCC)     Hyperlipidemia     Hypertension     Peripheral neuropathy     Peripheral vascular disease (HCC)     Spinal stenosis     Stroke (HCC)        Past Surgical History:  "  Procedure Laterality Date    LIVER BIOPSY      ROTATOR CUFF REPAIR      TONSILLECTOMY         Family History   Problem Relation Age of Onset    Heart disease Father     COPD Father     Stroke Sister          Medications have been verified.        Objective   /64 (BP Location: Left arm, Patient Position: Sitting)   Pulse 78   Temp 97.7 °F (36.5 °C)   Resp 18   Ht 6' 1\" (1.854 m)   Wt 104 kg (230 lb)   SpO2 98%   BMI 30.34 kg/m²        Physical Exam     Physical Exam  Constitutional:       General: He is not in acute distress.     Appearance: Normal appearance. He is well-developed. He is not diaphoretic.   HENT:      Head: Normocephalic and atraumatic.      Right Ear: Hearing, tympanic membrane, ear canal and external ear normal.      Left Ear: Hearing, tympanic membrane, ear canal and external ear normal.      Mouth/Throat:      Mouth: Mucous membranes are moist.      Pharynx: Oropharynx is clear. Uvula midline. Posterior oropharyngeal erythema present. No oropharyngeal exudate.      Tonsils: No tonsillar abscesses.   Cardiovascular:      Rate and Rhythm: Normal rate and regular rhythm.   Pulmonary:      Effort: Pulmonary effort is normal. No respiratory distress.      Breath sounds: Normal breath sounds. No stridor. No wheezing, rhonchi or rales.   Chest:      Chest wall: No tenderness.   Musculoskeletal:      Cervical back: Normal range of motion and neck supple.   Skin:     Findings: Rash present. Rash is macular and papular.   Neurological:      Mental Status: He is alert.                   "

## 2025-03-23 LAB — BACTERIA THROAT CULT: NORMAL

## 2025-04-02 DIAGNOSIS — B00.9 HERPES SIMPLEX INFECTION OF SKIN: ICD-10-CM

## 2025-04-02 RX ORDER — VALACYCLOVIR HYDROCHLORIDE 500 MG/1
500 TABLET, FILM COATED ORAL DAILY
Qty: 90 TABLET | Refills: 1 | Status: SHIPPED | OUTPATIENT
Start: 2025-04-02 | End: 2026-03-28

## 2025-04-02 NOTE — TELEPHONE ENCOUNTER
Reason for call:   [x] Refill   [] Prior Auth  [] Other:     Office:   [] PCP/Provider -   [x] Specialty/Provider -  [facility]  Authorized By: Mustapha Foy MD    Medication: valACYclovir (VALTREX) 500 mg tablet ()     Pharmacy: [id number]  [place] - [address]  [phone]     Local Pharmacy   Does the patient have enough for 3 days?   [] Yes   [x] No - Send as HP to POD    Mail Away Pharmacy   Does the patient have enough for 10 days?   [] Yes   [] No - Send as HP to POD

## 2025-07-17 ENCOUNTER — TELEPHONE (OUTPATIENT)
Age: 70
End: 2025-07-17

## 2025-07-17 NOTE — TELEPHONE ENCOUNTER
Rec'd return call from patient stating that he rec'd message regarding cancellation.    Rescheduled follow-up with Roque on 10/21/2025 @ 10:20 am in [place].    Patient aware of office location.  Patient aware to arrive 15 minutes prior.  Confirmed MEDICARE/[facility] health coverage.

## 2025-07-17 NOTE — TELEPHONE ENCOUNTER
Left voice mail to reschedule appointment scheduled with Dr. Kapadia. Message stated that patient can schedule to be seen in [place] with an AP or can be seen by a physician at another site. Main derm number left for patient to contact office [phone].